# Patient Record
Sex: FEMALE | Race: OTHER | Employment: PART TIME | URBAN - METROPOLITAN AREA
[De-identification: names, ages, dates, MRNs, and addresses within clinical notes are randomized per-mention and may not be internally consistent; named-entity substitution may affect disease eponyms.]

---

## 2017-07-05 ENCOUNTER — APPOINTMENT (OUTPATIENT)
Dept: ULTRASOUND IMAGING | Age: 27
End: 2017-07-05
Attending: PHYSICIAN ASSISTANT
Payer: MEDICAID

## 2017-07-05 ENCOUNTER — HOSPITAL ENCOUNTER (EMERGENCY)
Age: 27
Discharge: HOME | End: 2017-07-05
Attending: EMERGENCY MEDICINE | Admitting: EMERGENCY MEDICINE
Payer: MEDICAID

## 2017-07-05 VITALS
DIASTOLIC BLOOD PRESSURE: 78 MMHG | RESPIRATION RATE: 18 BRPM | SYSTOLIC BLOOD PRESSURE: 118 MMHG | OXYGEN SATURATION: 100 % | BODY MASS INDEX: 26.55 KG/M2 | TEMPERATURE: 98.9 F | WEIGHT: 165.2 LBS | HEART RATE: 84 BPM | HEIGHT: 66 IN

## 2017-07-05 DIAGNOSIS — O20.0 MISCARRIAGE, THREATENED, EARLY PREGNANCY: Primary | ICD-10-CM

## 2017-07-05 LAB
ABO + RH BLD: NORMAL
ALBUMIN SERPL BCP-MCNC: 3.8 G/DL (ref 3.5–5)
ALBUMIN/GLOB SERPL: 1 {RATIO} (ref 1.1–2.2)
ALP SERPL-CCNC: 46 U/L (ref 45–117)
ALT SERPL-CCNC: 17 U/L (ref 12–78)
ANION GAP BLD CALC-SCNC: 7 MMOL/L (ref 5–15)
APPEARANCE UR: CLEAR
AST SERPL W P-5'-P-CCNC: 9 U/L (ref 15–37)
BACTERIA URNS QL MICRO: NEGATIVE /HPF
BASOPHILS # BLD AUTO: 0 K/UL (ref 0–0.1)
BASOPHILS # BLD: 0 % (ref 0–1)
BILIRUB SERPL-MCNC: 0.4 MG/DL (ref 0.2–1)
BILIRUB UR QL: NEGATIVE
BLOOD BANK CMNT PATIENT-IMP: NORMAL
BUN SERPL-MCNC: 11 MG/DL (ref 6–20)
BUN/CREAT SERPL: 12 (ref 12–20)
CALCIUM SERPL-MCNC: 8.6 MG/DL (ref 8.5–10.1)
CHLORIDE SERPL-SCNC: 103 MMOL/L (ref 97–108)
CO2 SERPL-SCNC: 26 MMOL/L (ref 21–32)
COLOR UR: NORMAL
CREAT SERPL-MCNC: 0.9 MG/DL (ref 0.55–1.02)
EOSINOPHIL # BLD: 0.1 K/UL (ref 0–0.4)
EOSINOPHIL NFR BLD: 3 % (ref 0–7)
EPITH CASTS URNS QL MICRO: NORMAL /LPF
ERYTHROCYTE [DISTWIDTH] IN BLOOD BY AUTOMATED COUNT: 13.2 % (ref 11.5–14.5)
GLOBULIN SER CALC-MCNC: 3.9 G/DL (ref 2–4)
GLUCOSE SERPL-MCNC: 94 MG/DL (ref 65–100)
GLUCOSE UR STRIP.AUTO-MCNC: NEGATIVE MG/DL
HCG SERPL-ACNC: ABNORMAL MIU/ML (ref 0–6)
HCT VFR BLD AUTO: 36.8 % (ref 35–47)
HGB BLD-MCNC: 12.5 G/DL (ref 11.5–16)
HGB UR QL STRIP: NEGATIVE
HYALINE CASTS URNS QL MICRO: NORMAL /LPF (ref 0–5)
KETONES UR QL STRIP.AUTO: NEGATIVE MG/DL
LEUKOCYTE ESTERASE UR QL STRIP.AUTO: NEGATIVE
LYMPHOCYTES # BLD AUTO: 39 % (ref 12–49)
LYMPHOCYTES # BLD: 1.8 K/UL (ref 0.8–3.5)
MCH RBC QN AUTO: 28.5 PG (ref 26–34)
MCHC RBC AUTO-ENTMCNC: 34 G/DL (ref 30–36.5)
MCV RBC AUTO: 83.8 FL (ref 80–99)
MONOCYTES # BLD: 0.4 K/UL (ref 0–1)
MONOCYTES NFR BLD AUTO: 10 % (ref 5–13)
NEUTS SEG # BLD: 2.1 K/UL (ref 1.8–8)
NEUTS SEG NFR BLD AUTO: 48 % (ref 32–75)
NITRITE UR QL STRIP.AUTO: NEGATIVE
PH UR STRIP: 7.5 [PH] (ref 5–8)
PLATELET # BLD AUTO: 228 K/UL (ref 150–400)
POTASSIUM SERPL-SCNC: 3.9 MMOL/L (ref 3.5–5.1)
PROT SERPL-MCNC: 7.7 G/DL (ref 6.4–8.2)
PROT UR STRIP-MCNC: NEGATIVE MG/DL
RBC # BLD AUTO: 4.39 M/UL (ref 3.8–5.2)
RBC #/AREA URNS HPF: NORMAL /HPF (ref 0–5)
SODIUM SERPL-SCNC: 136 MMOL/L (ref 136–145)
SP GR UR REFRACTOMETRY: 1.02 (ref 1–1.03)
UA: UC IF INDICATED,UAUC: NORMAL
UROBILINOGEN UR QL STRIP.AUTO: 1 EU/DL (ref 0.2–1)
WBC # BLD AUTO: 4.5 K/UL (ref 3.6–11)
WBC URNS QL MICRO: NORMAL /HPF (ref 0–4)

## 2017-07-05 PROCEDURE — 81001 URINALYSIS AUTO W/SCOPE: CPT | Performed by: PHYSICIAN ASSISTANT

## 2017-07-05 PROCEDURE — 74011250636 HC RX REV CODE- 250/636: Performed by: PHYSICIAN ASSISTANT

## 2017-07-05 PROCEDURE — 96360 HYDRATION IV INFUSION INIT: CPT

## 2017-07-05 PROCEDURE — 86900 BLOOD TYPING SEROLOGIC ABO: CPT | Performed by: PHYSICIAN ASSISTANT

## 2017-07-05 PROCEDURE — 85025 COMPLETE CBC W/AUTO DIFF WBC: CPT | Performed by: PHYSICIAN ASSISTANT

## 2017-07-05 PROCEDURE — 84702 CHORIONIC GONADOTROPIN TEST: CPT | Performed by: PHYSICIAN ASSISTANT

## 2017-07-05 PROCEDURE — 80053 COMPREHEN METABOLIC PANEL: CPT | Performed by: PHYSICIAN ASSISTANT

## 2017-07-05 PROCEDURE — 99283 EMERGENCY DEPT VISIT LOW MDM: CPT

## 2017-07-05 PROCEDURE — 36415 COLL VENOUS BLD VENIPUNCTURE: CPT | Performed by: PHYSICIAN ASSISTANT

## 2017-07-05 PROCEDURE — 76801 OB US < 14 WKS SINGLE FETUS: CPT

## 2017-07-05 PROCEDURE — 76817 TRANSVAGINAL US OBSTETRIC: CPT

## 2017-07-05 RX ADMIN — SODIUM CHLORIDE 1000 ML: 900 INJECTION, SOLUTION INTRAVENOUS at 21:08

## 2017-07-06 NOTE — DISCHARGE INSTRUCTIONS
Threatened Miscarriage: Care Instructions  Your Care Instructions    Some women have light spotting or bleeding during the first 12 weeks of pregnancy. In some cases this is normal. Light spotting or bleeding can also be a sign of a possible loss of the pregnancy. This is called a threatened miscarriage. At this point, the doctor may not be able to tell if your vaginal bleeding is normal or is a sign of a miscarriage. In early pregnancy, things such as stress, exercise, and sex do not cause miscarriage. You may be worried or upset about the possibility of losing your pregnancy. But do not blame yourself. There is no treatment to stop a threatened miscarriage. If you do have a miscarriage, there was nothing you could have done to prevent it. A miscarriage usually means that the pregnancy is not developing normally. The doctor has checked you carefully, but problems can develop later. If you notice any problems or new symptoms, get medical treatment right away. Follow-up care is a key part of your treatment and safety. Be sure to make and go to all appointments, and call your doctor if you are having problems. It's also a good idea to know your test results and keep a list of the medicines you take. How can you care for yourself at home? · If you do have a miscarriage, you will probably have some vaginal bleeding for 1 to 2 weeks. Use pads instead of tampons. · Take acetaminophen (Tylenol) for cramps. Read and follow all instructions on the label. · Do not take two or more pain medicines at the same time unless the doctor told you to. Many pain medicines have acetaminophen, which is Tylenol. Too much acetaminophen (Tylenol) can be harmful. · Do not have sex until your doctor says it is okay. · Get lots of rest over the next several days. · You may do your normal activities if you feel well enough to do them. But do not do any heavy exercise until your doctor says it is okay.   · Eat a balanced diet that is high in iron and vitamin C. Foods rich in iron include red meat, shellfish, eggs, beans, and leafy green vegetables. Foods high in vitamin C include citrus fruits, tomatoes, and broccoli. Talk to your doctor about whether you need to take iron pills or a multivitamin. · Do not drink alcohol or use tobacco or illegal drugs. · Do not smoke. If you need help quitting, talk to your doctor about stop-smoking programs and medicines. These can increase your chances of quitting for good. When should you call for help? Call 911 anytime you think you may need emergency care. For example, call if:  · You have sudden, severe pain in your belly or pelvis. · You passed out (lost consciousness). · You have severe vaginal bleeding. Call your doctor now or seek immediate medical care if:  · You are dizzy or lightheaded, or you feel like you may faint. · You have new or increased pain in your belly or pelvis. · Your vaginal bleeding is getting worse. · You have increased pain in the vaginal area. · You have a fever. · You think you may have passed tissue. Save any tissue that you pass. Take it to your doctor's office as soon as you can. Watch closely for changes in your health, and be sure to contact your doctor if:  · You have new or worse vaginal discharge. · You do not get better as expected. Where can you learn more? Go to http://errol-kamla.info/. Enter Y217 in the search box to learn more about \"Threatened Miscarriage: Care Instructions. \"  Current as of: March 16, 2017  Content Version: 11.3  © 7454-5811 [employer], [employer]. Care instructions adapted under license by [employer] (which disclaims liability or warranty for this information). If you have questions about a medical condition or this instruction, always ask your healthcare professional. [address] any warranty or liability for your use of this information.          We hope that we have addressed all of your medical concerns. The examination and treatment you received in the Emergency Department were for an emergent problem and were not intended as complete care. It is important that you follow up with your healthcare provider(s) for ongoing care. If your symptoms worsen or do not improve as expected, and you are unable to reach your usual health care provider(s), you should return to the Emergency Department. Today's healthcare is undergoing tremendous change, and patient satisfaction surveys are one of the many tools to assess the quality of medical care. You may receive a survey from the [employer] organization regarding your experience in the Emergency Department. I hope that your experience has been completely positive, particularly the medical care that I provided. As such, please participate in the survey; anything less than excellent does not meet my expectations or intentions. [facility] and [address] [place] [address] participate in nationally recognized quality of care measures. If your blood pressure is greater than 120/80, as reported below, we urge that you seek medical care to address the potential of high blood pressure, commonly known as hypertension. Hypertension can be hereditary or can be caused by certain medical conditions, pain, stress, or \"white coat syndrome. \"       Please make an appointment with your health care provider(s) for follow up of your Emergency Department visit. VITALS:   Patient Vitals for the past 8 hrs:   Temp Pulse Resp BP SpO2   07/05/17 2054 99.5 °F (37.5 °C) 86 18 115/75 100 %          Thank you for allowing us to provide you with medical care today. We realize that you have many choices for your emergency care needs. Please choose us in the future for any continued health care needs. CHRISTOPH Barreto    [facility].   Office: [phone]            Recent Results (from the past 24 hour(s))   CBC WITH AUTOMATED DIFF    Collection Time: 07/05/17  9:05 PM   Result Value Ref Range    WBC 4.5 3.6 - 11.0 K/uL    RBC 4.39 3.80 - 5.20 M/uL    HGB 12.5 11.5 - 16.0 g/dL    HCT 36.8 35.0 - 47.0 %    MCV 83.8 80.0 - 99.0 FL    MCH 28.5 26.0 - 34.0 PG    MCHC 34.0 30.0 - 36.5 g/dL    RDW 13.2 11.5 - 14.5 %    PLATELET 671 133 - 792 K/uL    NEUTROPHILS 48 32 - 75 %    LYMPHOCYTES 39 12 - 49 %    MONOCYTES 10 5 - 13 %    EOSINOPHILS 3 0 - 7 %    BASOPHILS 0 0 - 1 %    ABS. NEUTROPHILS 2.1 1.8 - 8.0 K/UL    ABS. LYMPHOCYTES 1.8 0.8 - 3.5 K/UL    ABS. MONOCYTES 0.4 0.0 - 1.0 K/UL    ABS. EOSINOPHILS 0.1 0.0 - 0.4 K/UL    ABS. BASOPHILS 0.0 0.0 - 0.1 K/UL   METABOLIC PANEL, COMPREHENSIVE    Collection Time: 07/05/17  9:05 PM   Result Value Ref Range    Sodium 136 136 - 145 mmol/L    Potassium 3.9 3.5 - 5.1 mmol/L    Chloride 103 97 - 108 mmol/L    CO2 26 21 - 32 mmol/L    Anion gap 7 5 - 15 mmol/L    Glucose 94 65 - 100 mg/dL    BUN 11 6 - 20 MG/DL    Creatinine 0.90 0.55 - 1.02 MG/DL    BUN/Creatinine ratio 12 12 - 20      GFR est AA >60 >60 ml/min/1.73m2    GFR est non-AA >60 >60 ml/min/1.73m2    Calcium 8.6 8.5 - 10.1 MG/DL    Bilirubin, total 0.4 0.2 - 1.0 MG/DL    ALT (SGPT) 17 12 - 78 U/L    AST (SGOT) 9 (L) 15 - 37 U/L    Alk. phosphatase 46 45 - 117 U/L    Protein, total 7.7 6.4 - 8.2 g/dL    Albumin 3.8 3.5 - 5.0 g/dL    Globulin 3.9 2.0 - 4.0 g/dL    A-G Ratio 1.0 (L) 1.1 - 2.2     TOTAL HCG, QT.     Collection Time: 07/05/17  9:05 PM   Result Value Ref Range    Beta HCG, QT 760098 (H) 0 - 6 MIU/ML   TYPE, ABO & RH    Collection Time: 07/05/17  9:05 PM   Result Value Ref Range    ABO/Rh(D) B POSITIVE     Comment SAMPLE NOT USABLE FOR CROSSMATCH    URINALYSIS W/ REFLEX CULTURE    Collection Time: 07/05/17  9:05 PM   Result Value Ref Range    Color YELLOW/STRAW      Appearance CLEAR CLEAR      Specific gravity 1.020 1.003 - 1.030      pH (UA) 7.5 5.0 - 8.0      Protein NEGATIVE NEG mg/dL    Glucose NEGATIVE  NEG mg/dL    Ketone NEGATIVE  NEG mg/dL    Bilirubin NEGATIVE  NEG      Blood NEGATIVE  NEG      Urobilinogen 1.0 0.2 - 1.0 EU/dL    Nitrites NEGATIVE  NEG      Leukocyte Esterase NEGATIVE  NEG      WBC 0-4 0 - 4 /hpf    RBC 0-5 0 - 5 /hpf    Epithelial cells FEW FEW /lpf    Bacteria NEGATIVE  NEG /hpf    UA:UC IF INDICATED CULTURE NOT INDICATED BY UA RESULT CNI      Hyaline cast 0-2 0 - 5 /lpf       Us Uts Transvaginal Ob    Result Date: 7/5/2017  EXAM:  US PREG UTS < 14 WKS SNGL, US UTS TRANSVAGINAL OB INDICATION: Pelvic cramping and bleeding for one day. LMP  5/7/2017. COMPARISON: None. TECHNIQUE: Transabdominal and transvaginal sonography of the pelvis was performed. Real-time sonography of the pelvis was performed transabdominally with multiple static images obtained. Transvaginal sonography was performed with multiple static images of the uterus and ovaries obtained. TRANSABDOMINAL FINDINGS: UTERUS: The uterus measures 8.9 x 6.8 x 5.5 cm. The examination demonstrates a single intrauterine pregnancy with a crown-rump length of 1.2 cm and gestational sac measurement of 2.5 cm estimated gestational age of 7 weeks 2 days. Fetal cardiac activity is identified with a fetal heart rate of 160 beats per minute. The gestation is too early to permit evaluation of the placental anatomic structure and assessment of qualitative amniotic fluid volume. OVARIES: The ovaries are normal in appearance. The right ovary measures 3.6 x 3.5 x 2.6 cm and the left ovary measures 2.2 x 2.2 x 1.4 cm. There is a right ovarian cyst. There is normal color flow to the ovaries. Leann Christopher FINDINGS: UTERUS: The examination demonstrates a single intrauterine pregnancy with a crown-rump length of 1.2 cm and gestational sac measurement of 3 cm estimated gestational age of 9 weeks 4 days +/- 4 days. Fetal cardiac activity is identified with a fetal heart rate of 158 beats per minute.  The gestation is too early to permit evaluation of the placental anatomic structure and assessment of qualitative amniotic fluid volume. OVARIES: The right ovary measures 4.3 x 3.7 x 3.2 cm with a 3.5 x 3.4 x 3 cm cyst and the left ovary not visualized due to bowel gas. There is normal color flow to the right ovary. .     IMPRESSION: Single live 7 week 4 day +/- 4 day intrauterine pregnancy. Us Preg Uts < 14 Wks Sngl    Result Date: 7/5/2017  EXAM:  US PREG UTS < 14 WKS SNGL, US UTS TRANSVAGINAL OB INDICATION: Pelvic cramping and bleeding for one day. LMP  5/7/2017. COMPARISON: None. TECHNIQUE: Transabdominal and transvaginal sonography of the pelvis was performed. Real-time sonography of the pelvis was performed transabdominally with multiple static images obtained. Transvaginal sonography was performed with multiple static images of the uterus and ovaries obtained. TRANSABDOMINAL FINDINGS: UTERUS: The uterus measures 8.9 x 6.8 x 5.5 cm. The examination demonstrates a single intrauterine pregnancy with a crown-rump length of 1.2 cm and gestational sac measurement of 2.5 cm estimated gestational age of 7 weeks 2 days. Fetal cardiac activity is identified with a fetal heart rate of 160 beats per minute. The gestation is too early to permit evaluation of the placental anatomic structure and assessment of qualitative amniotic fluid volume. OVARIES: The ovaries are normal in appearance. The right ovary measures 3.6 x 3.5 x 2.6 cm and the left ovary measures 2.2 x 2.2 x 1.4 cm. There is a right ovarian cyst. There is normal color flow to the ovaries. [place] [employer] FINDINGS: UTERUS: The examination demonstrates a single intrauterine pregnancy with a crown-rump length of 1.2 cm and gestational sac measurement of 3 cm estimated gestational age of 9 weeks 4 days +/- 4 days. Fetal cardiac activity is identified with a fetal heart rate of 158 beats per minute.  The gestation is too early to permit evaluation of the placental anatomic structure and assessment of qualitative amniotic fluid volume. OVARIES: The right ovary measures 4.3 x 3.7 x 3.2 cm with a 3.5 x 3.4 x 3 cm cyst and the left ovary not visualized due to bowel gas. There is normal color flow to the right ovary. .     IMPRESSION: Single live 7 week 4 day +/- 4 day intrauterine pregnancy.

## 2017-07-06 NOTE — ED PROVIDER NOTES
HPI Comments: 22 yo female with no significant PMH here for evaluation of vaginal bleeding. States she took a home pregnancy test that was positive. Tonight with lower abd cramping and spotting. Denies fever, chills, CP, SOB, flank pain, urinary symptoms. LMP May 7th; no current GYN. Non smoker. Patient is a 23 y.o. female presenting with pregnancy problem. The history is provided by the patient. The history is limited by a language barrier. A  was used. Pregnancy Problem    This is a new problem. The current episode started 3 to 5 hours ago. The problem occurs constantly. The pain is located in the suprapubic region. The quality of the pain is cramping. The pain is at a severity of 4/10. The pain is mild. Pertinent negatives include no dysuria, no frequency, no headaches, no myalgias, no chest pain and no back pain. Nothing worsens the pain. The pain is relieved by nothing. History reviewed. No pertinent past medical history. Past Surgical History:   Procedure Laterality Date    HX  SECTION           History reviewed. No pertinent family history. Social History     Social History    Marital status:      Spouse name: N/A    Number of children: N/A    Years of education: N/A     Occupational History    Not on file. Social History Main Topics    Smoking status: Never Smoker    Smokeless tobacco: Never Used    Alcohol use No    Drug use: No    Sexual activity: Yes     Partners: Male     Other Topics Concern    Not on file     Social History Narrative         ALLERGIES: Review of patient's allergies indicates no known allergies. Review of Systems   Constitutional: Negative. HENT: Negative for ear discharge. Eyes: Negative for photophobia, pain, discharge and visual disturbance. Respiratory: Negative for apnea, cough, chest tightness and shortness of breath. Cardiovascular: Negative for chest pain, palpitations and leg swelling. Gastrointestinal: Positive for abdominal pain. Negative for abdominal distention and blood in stool. Genitourinary: Positive for vaginal bleeding. Negative for difficulty urinating, dysuria, flank pain and frequency. Musculoskeletal: Negative for back pain, gait problem, joint swelling, myalgias and neck pain. Skin: Negative for color change and pallor. Neurological: Negative for dizziness, syncope, weakness, numbness and headaches. Psychiatric/Behavioral: Negative for behavioral problems and confusion. The patient is not nervous/anxious. Vitals:    07/05/17 2054   BP: 115/75   Pulse: 86   Resp: 18   Temp: 99.5 °F (37.5 °C)   SpO2: 100%   Weight: 74.9 kg (165 lb 3.2 oz)   Height: 5' 6\" (1.676 m)            Physical Exam   Constitutional: She is oriented to person, place, and time. She appears well-developed and well-nourished. HENT:   Head: Normocephalic and atraumatic. Right Ear: External ear normal.   Left Ear: External ear normal.   Nose: Nose normal.   Mouth/Throat: Oropharynx is clear and moist.   Eyes: Conjunctivae and EOM are normal. Pupils are equal, round, and reactive to light. Right eye exhibits no discharge. Left eye exhibits no discharge. Neck: Normal range of motion. Neck supple. Cardiovascular: Normal rate, regular rhythm, normal heart sounds and intact distal pulses. Pulmonary/Chest: Effort normal and breath sounds normal.   Abdominal: Soft. Bowel sounds are normal. She exhibits no distension. There is tenderness (Mild suprapubic). There is no rebound and no guarding. Musculoskeletal: Normal range of motion. She exhibits no edema or tenderness. Neurological: She is alert and oriented to person, place, and time. No cranial nerve deficit. Coordination normal.   Skin: Skin is warm and dry. No rash noted. Psychiatric: She has a normal mood and affect. Her behavior is normal. Judgment and thought content normal.   Nursing note and vitals reviewed.        MDM  Number of Diagnoses or Management Options  Miscarriage, threatened, early pregnancy:   Diagnosis management comments: 24 yo pregnant female with vaginal bleeding; labs and US done; pt aware of threatened miscarriage and early pregnancy; will have GYN followup. Given clinic and GYN information. CHRISTOPH Busby         Amount and/or Complexity of Data Reviewed  Clinical lab tests: ordered and reviewed  Tests in the radiology section of CPT®: ordered and reviewed  Discuss the patient with other providers: yes  Independent visualization of images, tracings, or specimens: yes      ED Course       Procedures    Patient has been reassessed. Feeling Better. Reviewed labs, medications and radiographics with patient using  phone. Ready to discharge home. Will follow up with GYN. Discussed case with attending Physician. Agrees with care and will D/C with follow up. Patient's results have been reviewed with them. Patient and/or family have verbally conveyed their understanding and agreement of the patient's signs, symptoms, diagnosis, treatment and prognosis and additionally agree to follow up as recommended or return to the Emergency Room should their condition change prior to follow-up. Discharge instructions have also been provided to the patient with some educational information regarding their diagnosis as well a list of reasons why they would want to return to the ER prior to their follow-up appointment should their condition change.   CHRISTOPH Busby

## 2017-07-06 NOTE — ED NOTES
Pt given discharge instructions via telephone . All questions answered. Pt ambulatory with no complications at the time of discharge with  providing transportation.

## 2017-07-06 NOTE — ED TRIAGE NOTES
Triage: Pt to the ED for a \"baby test\". Both Pt and spouse speak little English and difficult to determine exact reason for coming to the ED. Pt speaks Hebrew, translation services utilized to aid with triage. Pt states she took a pregnancy test recently which yielded positive results. Today, she experienced vaginal bleeding and abd pain/cramping and she is concerned about the pregnancy. Pt states she has had to change her pad twice since onset at 1800 hours.

## 2017-08-23 LAB
ANTIBODY SCREEN, EXTERNAL: NEGATIVE
HBSAG, EXTERNAL: NON REACTIVE
HIV, EXTERNAL: NON REACTIVE
RPR, EXTERNAL: NON REACTIVE
RUBELLA, EXTERNAL: NORMAL
TYPE, ABO & RH, EXTERNAL: NORMAL

## 2018-01-18 LAB — GRBS, EXTERNAL: NEGATIVE

## 2018-02-02 ENCOUNTER — HOSPITAL ENCOUNTER (OUTPATIENT)
Dept: OTHER | Age: 28
Discharge: HOME | End: 2018-02-02
Payer: COMMERCIAL

## 2018-02-02 VITALS — HEIGHT: 61 IN | WEIGHT: 160.94 LBS | BODY MASS INDEX: 30.39 KG/M2

## 2018-02-02 LAB
ABO + RH BLD: NORMAL
BLOOD GROUP ANTIBODIES SERPL: NORMAL
ERYTHROCYTE [DISTWIDTH] IN BLOOD BY AUTOMATED COUNT: 13.7 % (ref 11.5–14.5)
HCT VFR BLD AUTO: 35.2 % (ref 35–47)
HGB BLD-MCNC: 12.3 G/DL (ref 11.5–16)
MCH RBC QN AUTO: 30.4 PG (ref 26–34)
MCHC RBC AUTO-ENTMCNC: 34.9 G/DL (ref 30–36.5)
MCV RBC AUTO: 87.1 FL (ref 80–99)
NRBC # BLD: 0 K/UL (ref 0–0.01)
NRBC BLD-RTO: 0 PER 100 WBC
PLATELET # BLD AUTO: 150 K/UL (ref 150–400)
PMV BLD AUTO: 10.2 FL (ref 8.9–12.9)
RBC # BLD AUTO: 4.04 M/UL (ref 3.8–5.2)
SPECIMEN EXP DATE BLD: NORMAL
WBC # BLD AUTO: 3.8 K/UL (ref 3.6–11)

## 2018-02-02 PROCEDURE — 36415 COLL VENOUS BLD VENIPUNCTURE: CPT | Performed by: OBSTETRICS & GYNECOLOGY

## 2018-02-02 PROCEDURE — 85027 COMPLETE CBC AUTOMATED: CPT | Performed by: OBSTETRICS & GYNECOLOGY

## 2018-02-02 PROCEDURE — 86900 BLOOD TYPING SEROLOGIC ABO: CPT | Performed by: OBSTETRICS & GYNECOLOGY

## 2018-02-02 RX ORDER — RANITIDINE HCL 75 MG
75 TABLET ORAL DAILY
COMMUNITY

## 2018-02-02 NOTE — PROGRESS NOTES
1100 Patient here for Pre-Admission Testing (PAT) for scheduled  section. Language line assisted with information. Pt speaks [employer]. Her language is Chinese.  # [id number] and [id number]. Had to call back on second phone because of poor sound quality on the first phone. PAT packet reviewed with the patient. Labs drawn and sent. BAILEY wipes and preventing surgical site infection education given to the patient. Education also provided to be NPO after Midnight and to arrive for scheduled procedure at 0600 on 18. Patient verbalizes understanding and sent home with PAT packet for further review. Patient instructed to take no medication(s) on the morning of her scheduled procedure.

## 2018-02-05 ENCOUNTER — HOSPITAL ENCOUNTER (INPATIENT)
Age: 28
LOS: 2 days | Discharge: HOME | DRG: 540 | End: 2018-02-07
Attending: OBSTETRICS & GYNECOLOGY | Admitting: OBSTETRICS & GYNECOLOGY
Payer: COMMERCIAL

## 2018-02-05 ENCOUNTER — ANESTHESIA EVENT (OUTPATIENT)
Dept: LABOR AND DELIVERY | Age: 28
DRG: 540 | End: 2018-02-05
Payer: COMMERCIAL

## 2018-02-05 ENCOUNTER — ANESTHESIA (OUTPATIENT)
Dept: LABOR AND DELIVERY | Age: 28
DRG: 540 | End: 2018-02-05
Payer: COMMERCIAL

## 2018-02-05 PROBLEM — Z3A.39 39 WEEKS GESTATION OF PREGNANCY: Status: ACTIVE | Noted: 2018-02-05

## 2018-02-05 PROCEDURE — 76010000391 HC C SECN FIRST 1 HR: Performed by: OBSTETRICS & GYNECOLOGY

## 2018-02-05 PROCEDURE — 65410000002 HC RM PRIVATE OB

## 2018-02-05 PROCEDURE — 77030007866 HC KT SPN ANES BBMI -B: Performed by: ANESTHESIOLOGY

## 2018-02-05 PROCEDURE — 74011250636 HC RX REV CODE- 250/636

## 2018-02-05 PROCEDURE — 76010000392 HC C SECN EA ADDL 0.5 HR: Performed by: OBSTETRICS & GYNECOLOGY

## 2018-02-05 PROCEDURE — 76060000078 HC EPIDURAL ANESTHESIA: Performed by: OBSTETRICS & GYNECOLOGY

## 2018-02-05 PROCEDURE — 74011250636 HC RX REV CODE- 250/636: Performed by: OBSTETRICS & GYNECOLOGY

## 2018-02-05 PROCEDURE — 75410000003 HC RECOV DEL/VAG/CSECN EA 0.5 HR: Performed by: OBSTETRICS & GYNECOLOGY

## 2018-02-05 PROCEDURE — 77030012890

## 2018-02-05 PROCEDURE — 77030034849

## 2018-02-05 PROCEDURE — 74011000250 HC RX REV CODE- 250

## 2018-02-05 PROCEDURE — 10907ZC DRAINAGE OF AMNIOTIC FLUID, THERAPEUTIC FROM PRODUCTS OF CONCEPTION, VIA NATURAL OR ARTIFICIAL OPENING: ICD-10-PCS | Performed by: OBSTETRICS & GYNECOLOGY

## 2018-02-05 PROCEDURE — 74011250636 HC RX REV CODE- 250/636: Performed by: ANESTHESIOLOGY

## 2018-02-05 RX ORDER — MORPHINE SULFATE 0.5 MG/ML
INJECTION, SOLUTION EPIDURAL; INTRATHECAL; INTRAVENOUS AS NEEDED
Status: DISCONTINUED | OUTPATIENT
Start: 2018-02-05 | End: 2018-02-05 | Stop reason: HOSPADM

## 2018-02-05 RX ORDER — BUPIVACAINE HYDROCHLORIDE 7.5 MG/ML
INJECTION, SOLUTION EPIDURAL; RETROBULBAR AS NEEDED
Status: DISCONTINUED | OUTPATIENT
Start: 2018-02-05 | End: 2018-02-05 | Stop reason: HOSPADM

## 2018-02-05 RX ORDER — NALBUPHINE HYDROCHLORIDE 10 MG/ML
2.5 INJECTION, SOLUTION INTRAMUSCULAR; INTRAVENOUS; SUBCUTANEOUS
Status: ACTIVE | OUTPATIENT
Start: 2018-02-05 | End: 2018-02-06

## 2018-02-05 RX ORDER — ACETAMINOPHEN 325 MG/1
650 TABLET ORAL
Status: DISCONTINUED | OUTPATIENT
Start: 2018-02-05 | End: 2018-02-07 | Stop reason: HOSPADM

## 2018-02-05 RX ORDER — KETOROLAC TROMETHAMINE 30 MG/ML
30 INJECTION, SOLUTION INTRAMUSCULAR; INTRAVENOUS
Status: DISPENSED | OUTPATIENT
Start: 2018-02-05 | End: 2018-02-06

## 2018-02-05 RX ORDER — MORPHINE SULFATE 2 MG/ML
2 INJECTION, SOLUTION INTRAMUSCULAR; INTRAVENOUS
Status: ACTIVE | OUTPATIENT
Start: 2018-02-05 | End: 2018-02-06

## 2018-02-05 RX ORDER — DIPHENHYDRAMINE HYDROCHLORIDE 50 MG/ML
25 INJECTION, SOLUTION INTRAMUSCULAR; INTRAVENOUS
Status: DISCONTINUED | OUTPATIENT
Start: 2018-02-05 | End: 2018-02-07 | Stop reason: HOSPADM

## 2018-02-05 RX ORDER — SODIUM CHLORIDE 0.9 % (FLUSH) 0.9 %
5-10 SYRINGE (ML) INJECTION AS NEEDED
Status: DISCONTINUED | OUTPATIENT
Start: 2018-02-05 | End: 2018-02-05 | Stop reason: HOSPADM

## 2018-02-05 RX ORDER — HYDROCORTISONE 1 %
CREAM (GRAM) TOPICAL AS NEEDED
Status: DISCONTINUED | OUTPATIENT
Start: 2018-02-05 | End: 2018-02-07 | Stop reason: HOSPADM

## 2018-02-05 RX ORDER — PHENYLEPHRINE 10 MG/250 ML(40 MCG/ML)IN 0.9 % SOD.CHLORIDE INTRAVENOUS
Status: DISPENSED
Start: 2018-02-05 | End: 2018-02-05

## 2018-02-05 RX ORDER — OXYCODONE AND ACETAMINOPHEN 5; 325 MG/1; MG/1
2 TABLET ORAL
Status: DISCONTINUED | OUTPATIENT
Start: 2018-02-05 | End: 2018-02-07 | Stop reason: HOSPADM

## 2018-02-05 RX ORDER — OXYTOCIN/RINGER'S LACTATE 20/1000 ML
125-1000 PLASTIC BAG, INJECTION (ML) INTRAVENOUS AS NEEDED
Status: DISCONTINUED | OUTPATIENT
Start: 2018-02-05 | End: 2018-02-07 | Stop reason: HOSPADM

## 2018-02-05 RX ORDER — ACETAMINOPHEN 10 MG/ML
INJECTION, SOLUTION INTRAVENOUS AS NEEDED
Status: DISCONTINUED | OUTPATIENT
Start: 2018-02-05 | End: 2018-02-05 | Stop reason: HOSPADM

## 2018-02-05 RX ORDER — AMMONIA 15 % (W/V)
1 AMPUL (EA) INHALATION AS NEEDED
Status: DISCONTINUED | OUTPATIENT
Start: 2018-02-05 | End: 2018-02-07 | Stop reason: HOSPADM

## 2018-02-05 RX ORDER — CEFAZOLIN SODIUM IN 0.9 % NACL 2 G/100 ML
PLASTIC BAG, INJECTION (ML) INTRAVENOUS AS NEEDED
Status: DISCONTINUED | OUTPATIENT
Start: 2018-02-05 | End: 2018-02-05 | Stop reason: HOSPADM

## 2018-02-05 RX ORDER — ONDANSETRON 2 MG/ML
4 INJECTION INTRAMUSCULAR; INTRAVENOUS
Status: DISCONTINUED | OUTPATIENT
Start: 2018-02-05 | End: 2018-02-07 | Stop reason: HOSPADM

## 2018-02-05 RX ORDER — SIMETHICONE 80 MG
80 TABLET,CHEWABLE ORAL
Status: DISCONTINUED | OUTPATIENT
Start: 2018-02-05 | End: 2018-02-07 | Stop reason: HOSPADM

## 2018-02-05 RX ORDER — NALOXONE HYDROCHLORIDE 0.4 MG/ML
0.2 INJECTION, SOLUTION INTRAMUSCULAR; INTRAVENOUS; SUBCUTANEOUS
Status: ACTIVE | OUTPATIENT
Start: 2018-02-05 | End: 2018-02-06

## 2018-02-05 RX ORDER — SODIUM CHLORIDE, SODIUM LACTATE, POTASSIUM CHLORIDE, CALCIUM CHLORIDE 600; 310; 30; 20 MG/100ML; MG/100ML; MG/100ML; MG/100ML
125 INJECTION, SOLUTION INTRAVENOUS CONTINUOUS
Status: DISCONTINUED | OUTPATIENT
Start: 2018-02-05 | End: 2018-02-07 | Stop reason: HOSPADM

## 2018-02-05 RX ORDER — EPHEDRINE SULFATE 50 MG/ML
INJECTION, SOLUTION INTRAVENOUS AS NEEDED
Status: DISCONTINUED | OUTPATIENT
Start: 2018-02-05 | End: 2018-02-05 | Stop reason: HOSPADM

## 2018-02-05 RX ORDER — NALBUPHINE HYDROCHLORIDE 10 MG/ML
2.5 INJECTION, SOLUTION INTRAMUSCULAR; INTRAVENOUS; SUBCUTANEOUS
Status: DISCONTINUED | OUTPATIENT
Start: 2018-02-05 | End: 2018-02-07 | Stop reason: HOSPADM

## 2018-02-05 RX ORDER — ACETAMINOPHEN 10 MG/ML
1000 INJECTION, SOLUTION INTRAVENOUS
Status: ACTIVE | OUTPATIENT
Start: 2018-02-05 | End: 2018-02-06

## 2018-02-05 RX ORDER — SODIUM CHLORIDE 0.9 % (FLUSH) 0.9 %
5-10 SYRINGE (ML) INJECTION AS NEEDED
Status: DISCONTINUED | OUTPATIENT
Start: 2018-02-05 | End: 2018-02-07 | Stop reason: HOSPADM

## 2018-02-05 RX ORDER — SODIUM CHLORIDE, SODIUM LACTATE, POTASSIUM CHLORIDE, CALCIUM CHLORIDE 600; 310; 30; 20 MG/100ML; MG/100ML; MG/100ML; MG/100ML
1000 INJECTION, SOLUTION INTRAVENOUS CONTINUOUS
Status: DISCONTINUED | OUTPATIENT
Start: 2018-02-05 | End: 2018-02-05 | Stop reason: HOSPADM

## 2018-02-05 RX ORDER — MORPHINE SULFATE 10 MG/ML
5 INJECTION, SOLUTION INTRAMUSCULAR; INTRAVENOUS
Status: ACTIVE | OUTPATIENT
Start: 2018-02-05 | End: 2018-02-06

## 2018-02-05 RX ORDER — DOCUSATE SODIUM 100 MG/1
100 CAPSULE, LIQUID FILLED ORAL
Status: DISCONTINUED | OUTPATIENT
Start: 2018-02-05 | End: 2018-02-07 | Stop reason: HOSPADM

## 2018-02-05 RX ORDER — SODIUM CHLORIDE 0.9 % (FLUSH) 0.9 %
5-10 SYRINGE (ML) INJECTION EVERY 8 HOURS
Status: DISCONTINUED | OUTPATIENT
Start: 2018-02-05 | End: 2018-02-05 | Stop reason: HOSPADM

## 2018-02-05 RX ORDER — ONDANSETRON 2 MG/ML
INJECTION INTRAMUSCULAR; INTRAVENOUS AS NEEDED
Status: DISCONTINUED | OUTPATIENT
Start: 2018-02-05 | End: 2018-02-05 | Stop reason: HOSPADM

## 2018-02-05 RX ORDER — OXYCODONE AND ACETAMINOPHEN 5; 325 MG/1; MG/1
1 TABLET ORAL
Status: DISCONTINUED | OUTPATIENT
Start: 2018-02-05 | End: 2018-02-07 | Stop reason: HOSPADM

## 2018-02-05 RX ORDER — OXYTOCIN/RINGER'S LACTATE 20/1000 ML
PLASTIC BAG, INJECTION (ML) INTRAVENOUS
Status: COMPLETED
Start: 2018-02-05 | End: 2018-02-05

## 2018-02-05 RX ORDER — DIPHENHYDRAMINE HCL 25 MG
25 CAPSULE ORAL
Status: DISCONTINUED | OUTPATIENT
Start: 2018-02-05 | End: 2018-02-07 | Stop reason: HOSPADM

## 2018-02-05 RX ORDER — CEFAZOLIN SODIUM/WATER 2 G/20 ML
2 SYRINGE (ML) INTRAVENOUS ONCE
Status: DISCONTINUED | OUTPATIENT
Start: 2018-02-05 | End: 2018-02-05 | Stop reason: HOSPADM

## 2018-02-05 RX ORDER — SODIUM CHLORIDE 0.9 % (FLUSH) 0.9 %
5-10 SYRINGE (ML) INJECTION EVERY 8 HOURS
Status: DISCONTINUED | OUTPATIENT
Start: 2018-02-05 | End: 2018-02-07 | Stop reason: HOSPADM

## 2018-02-05 RX ORDER — IBUPROFEN 400 MG/1
800 TABLET ORAL EVERY 8 HOURS
Status: DISCONTINUED | OUTPATIENT
Start: 2018-02-05 | End: 2018-02-07 | Stop reason: HOSPADM

## 2018-02-05 RX ORDER — ONDANSETRON 2 MG/ML
4 INJECTION INTRAMUSCULAR; INTRAVENOUS
Status: ACTIVE | OUTPATIENT
Start: 2018-02-05 | End: 2018-02-06

## 2018-02-05 RX ORDER — OXYTOCIN/RINGER'S LACTATE 20/1000 ML
PLASTIC BAG, INJECTION (ML) INTRAVENOUS
Status: DISCONTINUED | OUTPATIENT
Start: 2018-02-05 | End: 2018-02-05 | Stop reason: HOSPADM

## 2018-02-05 RX ADMIN — EPHEDRINE SULFATE 10 MG: 50 INJECTION, SOLUTION INTRAVENOUS at 08:51

## 2018-02-05 RX ADMIN — KETOROLAC TROMETHAMINE 30 MG: 30 INJECTION, SOLUTION INTRAMUSCULAR at 20:08

## 2018-02-05 RX ADMIN — BUPIVACAINE HYDROCHLORIDE 12 MG: 7.5 INJECTION, SOLUTION EPIDURAL; RETROBULBAR at 08:16

## 2018-02-05 RX ADMIN — SODIUM CHLORIDE, POTASSIUM CHLORIDE, SODIUM LACTATE AND CALCIUM CHLORIDE: 600; 310; 30; 20 INJECTION, SOLUTION INTRAVENOUS at 07:54

## 2018-02-05 RX ADMIN — KETOROLAC TROMETHAMINE 30 MG: 30 INJECTION, SOLUTION INTRAMUSCULAR at 11:01

## 2018-02-05 RX ADMIN — SODIUM CHLORIDE, POTASSIUM CHLORIDE, SODIUM LACTATE AND CALCIUM CHLORIDE: 600; 310; 30; 20 INJECTION, SOLUTION INTRAVENOUS at 08:24

## 2018-02-05 RX ADMIN — EPHEDRINE SULFATE 10 MG: 50 INJECTION, SOLUTION INTRAVENOUS at 08:21

## 2018-02-05 RX ADMIN — EPHEDRINE SULFATE 10 MG: 50 INJECTION, SOLUTION INTRAVENOUS at 08:32

## 2018-02-05 RX ADMIN — ACETAMINOPHEN 1000 MG: 10 INJECTION, SOLUTION INTRAVENOUS at 08:49

## 2018-02-05 RX ADMIN — EPHEDRINE SULFATE 5 MG: 50 INJECTION, SOLUTION INTRAVENOUS at 08:46

## 2018-02-05 RX ADMIN — SODIUM CHLORIDE, SODIUM LACTATE, POTASSIUM CHLORIDE, AND CALCIUM CHLORIDE 125 ML/HR: 600; 310; 30; 20 INJECTION, SOLUTION INTRAVENOUS at 11:38

## 2018-02-05 RX ADMIN — MORPHINE SULFATE 0.2 MCG: 0.5 INJECTION, SOLUTION EPIDURAL; INTRATHECAL; INTRAVENOUS at 08:16

## 2018-02-05 RX ADMIN — Medication 2 G: at 08:16

## 2018-02-05 RX ADMIN — ONDANSETRON 4 MG: 2 INJECTION INTRAMUSCULAR; INTRAVENOUS at 08:49

## 2018-02-05 RX ADMIN — EPHEDRINE SULFATE 10 MG: 50 INJECTION, SOLUTION INTRAVENOUS at 08:47

## 2018-02-05 RX ADMIN — EPHEDRINE SULFATE 5 MG: 50 INJECTION, SOLUTION INTRAVENOUS at 08:23

## 2018-02-05 RX ADMIN — Medication 10 ML: at 20:08

## 2018-02-05 RX ADMIN — Medication: at 08:44

## 2018-02-05 NOTE — LACTATION NOTE
This note was copied from a baby's chart.   1201:   Couplet Interdisciplinary Rounds     MATERNAL    Daily Goal:     Influenza screening completed: YES   Tdap screening completed: YES   Rhogam Given:N/A  MMR Given:N/A    VTE Prophylaxis: Mechanical    EPDS: not completed yet          Patient Name: Annette Gottron Diagnosis:   Single live    Date of Admission: 2018 LOS: 0  Gestational Age: Gestational Age: 39w1d       Daily Goal:     Birth Weight: 2.98 kg Current Weight: Weight: 2.98 kg (6-9)  % of Weight Change: 0%    Feeding:   Metabolic Screen: NO    Hepatitis B:  NO    Discharge Bili:  NO  Car Seat Trial, if needed:  N/A      Patient/Family Teaching Needs: Infant care    Days before discharge: Two or more days before discharge    In Attendance:  Nursing and Physician

## 2018-02-05 NOTE — IP AVS SNAPSHOT
[address] [address] 
[phone] Patient: Asia Webb MRN: [id number] :1990 A check taniya indicates which time of day the medication should be taken. My Medications START taking these medications Instructions Each Dose to Equal  
 Morning Noon Evening Bedtime  
 ibuprofen 800 mg tablet Commonly known as:  MOTRIN Your last dose was: Your next dose is: Take 1 Tab by mouth every eight (8) hours as needed for Pain. 800 mg  
    
   
   
   
  
 simethicone 80 mg chewable tablet Commonly known as:  Sophia Villalobos Your last dose was: Your next dose is: Take 1 Tab by mouth four (4) times daily as needed. 80 mg CONTINUE taking these medications Instructions Each Dose to Equal  
 Morning Noon Evening Bedtime PNV No12-Iron-FA-DSS-OM-3 29 mg iron-1 mg -50 mg Cpkd Your last dose was: Your next dose is: Take 1 Tab by mouth daily. 1 Tab ZANTAC 75 75 mg tablet Generic drug:  raNITIdine Your last dose was: Your next dose is: Take 75 mg by mouth daily. 75 mg Where to Get Your Medications Information on where to get these meds will be given to you by the nurse or doctor. ! Ask your nurse or doctor about these medications  
  ibuprofen 800 mg tablet  
 simethicone 80 mg chewable tablet

## 2018-02-05 NOTE — IP AVS SNAPSHOT
[facility] [address] [place] [address] 
[phone] Patient: Keyana García MRN: [id number] :1990 About your hospitalization You were admitted on:  2018 You last received care in the:  [address] [facility] You were discharged on:  2018 Why you were hospitalized Your primary diagnosis was:  Not on File Your diagnoses also included:  39 Weeks Gestation Of Pregnancy Follow-up Information Follow up With Details Comments Contact Info Kristina Murphy MD   15[facility] [address] [place] [address] 
[phone] [address] Call As needed for breastfeeding assistance [address] [place] [address], [address] 
[phone] Kristina Murphy MD Schedule an appointment as soon as possible for a visit in 1 week to have staples removed from incision 15[facility] [address] [place] [address] 
[phone] Discharge Orders None A check taniya indicates which time of day the medication should be taken. My Medications START taking these medications Instructions Each Dose to Equal  
 Morning Noon Evening Bedtime  
 ibuprofen 800 mg tablet Commonly known as:  MOTRIN Your last dose was: Your next dose is: Take 1 Tab by mouth every eight (8) hours as needed for Pain. 800 mg  
    
   
   
   
  
 simethicone 80 mg chewable tablet Commonly known as:  Eletha Lango Your last dose was: Your next dose is: Take 1 Tab by mouth four (4) times daily as needed. 80 mg CONTINUE taking these medications Instructions Each Dose to Equal  
 Morning Noon Evening Bedtime PNV No12-Iron-FA-DSS-OM-3 29 mg iron-1 mg -50 mg Cpkd Your last dose was: Your next dose is: Take 1 Tab by mouth daily. 1 Tab ZANTAC 75 75 mg tablet Generic drug:  raNITIdine Your last dose was: Your next dose is: Take 75 mg by mouth daily. 75 mg Where to Get Your Medications Information on where to get these meds will be given to you by the nurse or doctor. ! Ask your nurse or doctor about these medications  
  ibuprofen 800 mg tablet  
 simethicone 80 mg chewable tablet Discharge Instructions POSTPARTUM DISCHARGE INSTRUCTIONS Name:  Jatin Light YOB: 1990 Admission Diagnosis:  REPEAT , SPEAKS Belarusian 
39 weeks gestation of pregnancy Discharge Diagnosis:   
Problem List as of 2018  Date Reviewed: 2018 Codes Class Noted - Resolved 39 weeks gestation of pregnancy ICD-10-CM: Z3A.39 
ICD-9-CM: V22.2  2018 - Present Attending Physician:  Lizet Carcamo MD 
 
Delivery Type:   Section: What to Expect at [facility] Your Recovery: A  section, or , is surgery to deliver your baby through a cut, called an incision that the doctor makes in your lower belly and uterus. You may have some pain in your lower belly and need pain medicine for 1 to 2 weeks. You can expect some vaginal bleeding for several weeks. You will probably need about 6 weeks to fully recover. It is important to take it easy while the incision is healing. Avoid heavy lifting, strenuous activities, or exercises that strain the belly muscles while you are recovering. Ask a family member or friend for help with housework, cooking, and shopping. This care sheet gives you a general idea about how long it will take for you to recover. But each person recovers at a different pace. Follow the steps below to get better as quickly as possible. How can you care for yourself at home? Activity · Rest when you feel tired. Getting enough sleep will help you recover. · Try to walk each day. Start by walking a little more than you did the day before. Bit by bit, increase the amount you walk. Walking boosts blood flow and helps prevent pneumonia, constipation, and blood clots. · Avoid strenuous activities, such as bicycle riding, jogging, weightlifting, and aerobic exercise, for 6 weeks or until your doctor says it is okay. · Until your doctor says it is okay, do not lift anything heavier than your baby. · Do not do sit-ups or other exercise that strain the belly muscles for 6 weeks or until your doctor says it is okay. · Hold a pillow over your incision when you cough or take deep breaths. This will support your belly and decrease your pain. · You may shower as usual. Pat the incision dry when you are done. · You will have some vaginal bleeding. Wear sanitary pads. Do not douche or use tampons until your doctor says it is okay. · Ask your doctor when you can drive again. · You will probably need to take at least 6 weeks off work. It depends on the type of work you do and how you feel. · Wait until you are healed (about 4 to 6 weeks) before you have sexual intercourse. Your doctor will tell you when it is okay to have sex. · Talk to your doctor about birth control. You can get pregnant even before your period returns. Also, you can get pregnant while you are breast-feeding. Incision care Your skin is your body's first line of defense against germs, but an incision site leaves an easy way for germs to enter your body. To prevent infection: · Clean your hands frequently and before and after changing any touching any dressings. · If you have strips of tape on the incision, leave the tape on for a week or until it falls off. · Look at your incision closely every day for any changes. Contact your doctor if you experience any signs of infection, such as fever or increased redness at the surgical site. · Wash the area daily with warm, soapy water, and pat it dry. Don't use hydrogen peroxide or alcohol, which can slow healing. You may cover the area with a gauze bandage if it weeps or rubs against clothing. Change the bandage every day. · Keep the area clean and dry. Diet · You can eat your normal diet. If your stomach is upset, try bland, low-fat foods like plain rice, broiled chicken, toast, and yogurt. · Drink plenty of fluids (unless your doctor tells you not to). · You may notice that your bowel movements are not regular right after your surgery. This is common. Try to avoid constipation and straining with bowel movements. You may want to take a fiber supplement every day. If you have not had a bowel movement after a couple of days, ask your doctor about taking a mild laxative. · If you are breast-feeding, do not drink any alcohol. Medicines · Take pain medicines exactly as directed. · If the doctor gave you a prescription medicine for pain, take it as prescribed. · If you are not taking a prescription pain medicine, ask your doctor if you can take an over-the-counter medicine such as acetaminophen (Tylenol), ibuprofen (Advil, Motrin), or naproxen (Aleve), for cramps. Read and follow all instructions on the label. Do not take aspirin, because it can cause more bleeding. Do not take acetaminophen (Tylenol) and other acetaminophen containing medications (i.e. Percocet) at the same time. · If you think your pain medicine is making you sick to your stomach: 
· Take your medicine after meals (unless your doctor has told you not to). · Ask your doctor for a different pain medicine. · If your doctor prescribed antibiotics, take them as directed. Do not stop taking them just because you feel better. You need to take the full course of antibiotics. Mental Health · Many women get the \"baby blues\" during the first few days after childbirth. You may lose sleep, feel irritable, and cry easily.  You may feel happy one minute and sad the next. Hormone changes are one cause of these emotional changes. Also, the demands of a new baby, along with visits from relatives or other family needs, add to a mother's stress. The \"baby blues\" often peak around the fourth day. Then they ease up in less than 2 weeks. · If your moodiness or anxiety lasts for more than 2 weeks, or if you feel like life is not worth living, you may have postpartum depression. This is different for each mother. Some mothers with serious depression may worry intensely about their infant's well-being. Others may feel distant from their child. Some mothers might even feel that they might harm their baby. A mother may have signs of paranoia, wondering if someone is watching her. · With all the changes in your life, you may not know if you are depressed. Pregnancy sometimes causes changes in how you feel that are similar to the symptoms of depression. · Symptoms of depression include: · Feeling sad or hopeless and losing interest in daily activities. These are the most common symptoms of depression. · Sleeping too much or not enough. · Feeling tired. You may feel as if you have no energy. · Eating too much or too little. · POSTPARTUM SUPPORT INTERNATIONAL (PSI) offers a Warm line; Chat with the Expert phone sessions; Information and Articles about Pregnancy and Postpartum Mood Disorders; Comprehensive List of Free Support Groups; Knowledgeable local coordinators who will offer support, information, and resources; Guide to Resources on Shozu; Calendar of events in the  mood disorders community; Latest News and Research; and [facility] [address] for [employer]. Remember - You are not alone; You are not to blame; With help, you will be well. 1-341-053-PPD(1861). WWW. POSTPARTUM. NET · Writing or talking about death, such as writing suicide notes or talking about guns, knives, or pills. Keep the numbers for these national suicide hotlines: 1-048-296-TALK (0-973.486.5423) and 5-497-VGZWCHB (2-355.754.5619). If you or someone you know talks about suicide or feeling hopeless, get help right away. Other instructions · If you breast-feed your baby, you may be more comfortable while you are healing if you place the baby so that he or she is not resting on your belly. Try tucking your baby under your arm, with his or her body along the side you will be feeding on. Support your baby's upper body with your arm. With that hand you can control your baby's head to bring his or her mouth to your breast. This is sometimes called the football hold. Follow-up care is a key part of your treatment and safety. Be sure to make and go to all appointments, and call your doctor if you are having problems. It's also a good idea to know your test results and keep a list of the medicines you take. When should you call for help? Call 911 anytime you think you may need emergency care. For example, call if: 
· You are thinking of hurting yourself, your baby, or anyone else. · You passed out (lost consciousness). · You have symptoms of a blood clot in your lung (called a pulmonary embolism). These may include: 
· Sudden chest pain. · Trouble breathing. · Coughing up blood. Call your doctor now or seek immediate medical care if: 
 
· You have severe vaginal bleeding. · You are soaking through a pad each hour for 2 or more hours. · Your vaginal bleeding seems to be getting heavier or is still bright red 4 days after delivery. · You are dizzy or lightheaded, or you feel like you may faint. · You are vomiting or cannot keep fluids down. · You have a fever. · You have new or more belly pain. · You have loose stitches, or your incision comes open. · You have symptoms of infection, such as: 
· Increased pain, swelling, warmth, or redness. · Red streaks leading from the incision. · Pus draining from the incision. · A fever · You pass tissue (not just blood). · Your vaginal discharge smells bad. · Your belly feels tender or full and hard. · Your breasts are continuously painful or red. · You feel sad, anxious, or hopeless for more than a few days. · You have sudden, severe pain in your belly. · You have symptoms of a blood clot in your leg (called a deep vein thrombosis), such as: 
· Pain in your calf, back of the knee, thigh, or groin. · Redness and swelling in your leg or groin. · You have symptoms of preeclampsia, such as: 
· Sudden swelling of your face, hands, or feet. · New vision problems (such as dimness or blurring). · A severe headache. · Your blood pressure is higher than it should be or rises suddenly. · You have new nausea or vomiting. Watch closely for changes in your health, and be sure to contact your doctor if you have any problems. Additional Information:  Preventing Infection at Home We care about preventing infection and avoiding the spread of germs - not only when you are in the hospital but also when you return home. When you return home from the hospital, it's important to take the following steps to help prevent infection and avoid spreading germs that could infect you and others. Ask everyone in your home to follow these guidelines, too. Clean Your Hands · Clean your hands whenever your hands are visibly dirty, before you eat, before or after touching your mouth, nose or eyes, and before preparing food. Clean them after contact with body fluids, using the restroom, touching animals or changing diapers. · When washing hands, wet them with warm water and work up a lather. Rub hands for at least 15 seconds, then rinse them and pat them dry with a clean towel or paper towel.  
· When using hand sanitizers, it should take about 15 seconds to rub your hands dry. If not, you probably didn't apply enough . Cover Your Sneeze or Cough Germs are released into the air whenever you sneeze or cough. To prevent the spread of infection: · Turn away from other people before coughing or sneezing. · Cover your mouth or nose with a tissue when you cough or sneeze. Put the tissue in the trash. · If you don't have a tissue, cough or sneeze into your upper sleeve, not your hands. · Always clean your hands after coughing or sneezing. Care for Wounds Your skin is your body's first line of defense against germs, but an open wound leaves an easy way for germs to enter your body. To prevent infection: · Clean your hands before and after changing wound dressings, and wear gloves to change dressings if recommended by your doctor. · Take special care with IV lines or other devices inserted into the body. If you must touch them, clean your hands first. 
· Follow any specific instructions from your doctor to care for your wounds. Contact your doctor if you experience any signs of infection, such as fever or increased redness at the surgical or wound site. Keep a [place] [address] · Clean or wipe commonly touched hard surfaces like door handles, sinks, tabletops, phones and TV remotes. · Use products labeled \"disinfectant\" to kill harmful bacteria and viruses. · Use a clean cloth or paper towel to clean and dry surfaces. Wiping surfaces with a dirty dishcloth, sponge or towel will only spread germs. · Never share toothbrushes, castañeda, drinking glasses, utensils, razor blades, face cloths or bath towels to avoid spreading germs. · Be sure that the linens that you sleep on are clean. · Keep pets away from wounds and wash your hands after touching pets, their toys or bedding. We care about you and your health. Remember, preventing infections is a  
team effort between you, your family, friends and health care providers. Postpartum Support PARENTS:  Are you feeling sad or depressed? Is it difficult for you to enjoy yourself? Do you feel more irritable or tense? Do you feel anxious or panicky? Are you having difficulty bonding with your baby? Do you feel as if you are \"out of control\" or \"going crazy\"? Are you worried that you might hurt your baby or yourself? FAMILIES: Do you worry that something is wrong but don't know how to help? Do you think that your partner or spouse is having problems coping? Are you worried that it may never get better? While many women experience some mild mood change or \"the blues\" during or after the birth of a child, 1 in 9 women experience more significant symptoms of depression or anxiety. 1 in 10 Dads become depressed during the first year. Things you can do Being a good parent includes taking care of yourself. If you take care of yourself, you will be able to take better care of your baby and your family. · Talk to a counselor or healthcare provider who has training in  mood and anxiety problems. · Learn as much as you can about pregnancy and postpartum depression and anxiety. · Get support from family and friends. Ask for help when you need it. · Join a support group in your area or online. · Keep active by walking, stretching or whatever form of exercise helps you to feel better. · Get enough rest and time for yourself. · Eat a healthy diet. · Don't give up! It may take more than one try to get the right help you need. These are general instructions for a healthy lifestyle: No smoking/ No tobacco products/ Avoid exposure to second hand smoke Surgeon General's Warning:  Quitting smoking now greatly reduces serious risk to your health. Obesity, smoking, and sedentary lifestyle greatly increases your risk for illness A healthy diet, regular physical exercise & weight monitoring are important for maintaining a healthy lifestyle Recognize signs and symptoms of STROKE: 
 F-face looks uneven A-arms unable to move or move unevenly S-speech slurred or non-existent T-time-call 911 as soon as signs and symptoms begin - DO NOT go  
    back to bed or wait to see if you get better - TIME IS BRAIN. I have had the opportunity to make my options or choices for discharge. I have received and understand these instructions. [employer] Announcement We are excited to announce that we are making your provider's discharge notes available to you in [employer]. You will see these notes when they are completed and signed by the physician that discharged you from your recent hospital stay. If you have any questions or concerns about any information you see in [employer], please call the Health Information Department where you were seen or reach out to your Primary Care Provider for more information about your plan of care. Introducing [facility] [place] [facility]! [facility] introduces [employer] patient portal. Now you can access parts of your medical record, email your doctor's office, and request medication refills online. 1. In your internet browser, go to [email or web address] 2. Click on the First Time User? Click Here link in the Sign In box. You will see the New Member Sign Up page. 3. Enter your [employer] Access Code exactly as it appears below. You will not need to use this code after youve completed the sign-up process. If you do not sign up before the expiration date, you must request a new code. · [employer] Access Code: [email or web address] Expires: 5/3/2018 12:00 PM 
 
4. Enter the last four digits of your Social Security Number (xxxx) and Date of Birth (mm/dd/yyyy) as indicated and click Submit. You will be taken to the next sign-up page. 5. Create a [email or web address] ID. This will be your [employer] login ID and cannot be changed, so think of one that is secure and easy to remember. 6. Create a [email or web address] password. You can change your password at any time. 7. Enter your Password Reset Question and Answer. This can be used at a later time if you forget your password. 8. Enter your e-mail address. You will receive e-mail notification when new information is available in [address] 9. Click Sign Up. You can now view and download portions of your medical record. 10. Click the Download Summary menu link to download a portable copy of your medical information. If you have questions, please visit the Frequently Asked Questions section of the [employer] website. Remember, [employer]Access is NOT to be used for urgent needs. For medical emergencies, dial 911. Now available from your iPhone and Android! Providers Seen During Your Hospitalization Provider Specialty Primary office phone Kristina Murphy MD Gynecology [phone] Your Primary Care Physician (PCP) Primary Care Physician Office Phone Office Fax Chastity Navarro [phone] You are allergic to the following No active allergies Recent Documentation Height Weight Breastfeeding? BMI OB Status Smoking Status 1.549 m 72.6 kg Unknown 30.23 kg/m2 Recent pregnancy Never Smoker Emergency Contacts Name Discharge Info Relation Home Work Mobile [address] [place] [address] CAREGIVER [3] Other Relative [6] [phone] Patient Belongings The following personal items are in your possession at time of discharge: 
  Dental Appliances: None  Visual Aid: None      Home Medications: None   Jewelry: With patient  Clothing: At bedside, With patient    Other Valuables: Cell Phone Please provide this summary of care documentation to your next provider. Signatures-by signing, you are acknowledging that this After Visit Summary has been reviewed with you and you have received a copy. Patient Signature:  ____________________________________________________________ Date:  ____________________________________________________________  
  
Curlie Ruths Provider Signature:  ____________________________________________________________ Date:  ____________________________________________________________

## 2018-02-05 NOTE — PROCEDURES
Section Delivery Operative Report     Date of Surgery: 2018     Preoperative Diagnosis: 2 prior CS, 39 weeks    Postoperative Diagnosis:same delivered viable female infant    Procedure: Procedure(s):  Repeat low transverse  SECTION via pfannenstiel incision    Surgeon(s) and Role:     * Jasmin Baptiste MD - Primary  Assist - Paul Muniz RN     Anesthesia: Spinal    Findings: Viable female infant in vertex position. Clear fluid. Apgars 9/10. Weight pending. Dense fascial adhesions but no intraperitoneal adhesions. Normal tubes/ovaries, placenta. Procedure Detail:    The patient was taken to the operating room, where spinal anesthesia was found to be adequate. The patient was prepped and draped in the normal sterile fashion. Pfannenstiel skin incision was made with the scalpel and carried down to the underlying fascia. The fascial incision was extended laterally with Goff scissors. This fascial incision was grasped with Kocher clamps, tented up, and the underlying rectus muscles were dissected bluntly. The inferior edge of the rectus fascia was grasped with Kocher clamps, tented up, and the underlying rectus muscle was dissected off bluntly. The rectus muscle was divided in the midline bluntly. The peritoneum was entered bluntly with hemostat and extended inferiorly and superiorly with Metzenbaum scissors. The bladder blade was then inserted. The vesicouterine and peritoneum was identified, grasped with pick-ups and entered sharply with Metzenbaum scissors. The bladder flap was then created digitally and the bladder blade was reinserted. A low transverse uterine incision was made with the scalpel and extended laterally with blunt finger dissection. The babys head was then delivered atraumatically. The nose and mouth were suctioned. The cord was clamped and cut and the baby was handed off to the waiting  care unit staff. Placenta was then delivered spontaneously.  The uterus was exteriorized and cleared of all clots and debris. The uterine incision was closed in two layers. The first layer with running locked layer of 0 monocryl. The second layer was an imbricating layer of 0 monocryl with good hemostasis assured. The pelvis was washed with warm normal saline. Good hemostasis was again reassured. The paracolic gutters were washed with warm normal saline and the uterus was returned to the abdomen. Good hemostasis was again reassured throughout. Bovie cautery was applied to cut rectus muscle edges for hemostasis. The fascia was closed with 0 Vicryl in a running fashion. Good hemostasis was assured. The skin was closed with staples. The patient tolerated the procedure well. Sponge, lap, and needle counts were correct times two and the patient was taken to recovery in stable condition.       Estimated Blood Loss:  800    Specimens:   ID Type Source Tests Collected by Time Destination   1 : placenta Placenta Uterus  Marck Jiang MD 2018 4090 Discarded        Cord Blood Results:  Information for the patient's :  Ceil Mcardle [[id number]]   No results found for: PCTABR, PCTDIG, BILI, ABORH    No results found for: APH, APCO2, APO2, AHCO3, ABEC, ABDC, O2ST, SITE, RSCOM     Prenatal Labs:  Lab Results   Component Value Date/Time    ABO/Rh(D) B POSITIVE 2018 11:02 AM    HBsAg, External Non reactive 2017    HIV, External Non reactive 2017    Rubella, External Immune 2017    RPR, External Non reactive 2017    GrBStrep, External Negative 2018        Signed By:  Marck Jiang MD     2018

## 2018-02-05 NOTE — H&P
History & Physical    Name: William Morgan MRN: [id number]  SSN: [id number]    YOB: 1990  Age: 33 y.o. Sex: female      Subjective:     Estimated Date of Delivery: 18  OB History    Para Term  AB Living   3 2 2 0 0 2   SAB TAB Ectopic Molar Multiple Live Births   0 0 0 0 0 2      # Outcome Date GA Lbr Moose/2nd Weight Sex Delivery Anes PTL Lv   3 Current            2 Term            1 Term                   Ms. Claudy Ugalde admitted with pregnancy at 36w3d for  section due to two prior  sections. Prenatal course was normal. Please see prenatal records for details. History reviewed. No pertinent past medical history. Past Surgical History:   Procedure Laterality Date     DELIVERY ONLY       and     HX  SECTION       Social History     Occupational History    Not on file. Social History Main Topics    Smoking status: Never Smoker    Smokeless tobacco: Never Used    Alcohol use No    Drug use: No    Sexual activity: Yes     Partners: Male     History reviewed. No pertinent family history. No Known Allergies  Prior to Admission medications    Medication Sig Start Date End Date Taking? Authorizing Provider   PNV No12-Iron-FA-DSS-OM-3 29 mg iron-1 mg -50 mg CPKD Take 1 Tab by mouth daily. Yes Historical Provider   raNITIdine (ZANTAC 75) 75 mg tablet Take 75 mg by mouth daily. Yes Historical Provider        Review of Systems: A comprehensive review of systems was negative except for that written in the History of Present Illness. Objective:     Vitals:  Vitals:    18 0557 18 0627   BP:  112/71   Pulse:  77   Resp:  14   Temp:  97.9 °F (36.6 °C)   Weight: 72.6 kg (160 lb)    Height: 5' 1\" (1.549 m)         Physical Exam:  Patient without distress.   Heart: Regular rate and rhythm  Lung: clear to auscultation throughout lung fields, no wheezes, no rales, no rhonchi and normal respiratory effort  Abdomen: soft, nontender  Fundus: soft and non tender  Lower Extremities:  - Edema No  Membranes:  Intact  Fetal Heart Rate: reassuring and reactive    Prenatal Labs:   Lab Results   Component Value Date/Time    ABO/Rh(D) B POSITIVE 2018 11:02 AM    Rubella, External Immune 2017    GrBStrep, External Negative 2018    HBsAg, External Non reactive 2017    HIV, External Non reactive 2017    RPR, External Non reactive 2017    ABO,Rh B Positive 2017         Impression/Plan:     Plan:  Admit for  section. Discussed the risks of surgery including the risks of bleeding, infection, deep vein thrombosis, and surgical injuries to internal organs including but not limited to the bowels, bladder, rectum, and female reproductive organs. The patient understands the risks; any and all questions were answered to the patient's satisfaction.     Signed By:  Adelita Harris MD     2018

## 2018-02-05 NOTE — ROUTINE PROCESS
1130: TRANSFER - IN REPORT:    Bedside & Verbal report received from Ernie Lee RN (name) on Nonzurdo Balding  being received from L&D (unit) for routine post - op      Report consisted of patients Situation, Background, Assessment and   Recommendations(SBAR). Information from the following report(s) SBAR and Intake/Output was reviewed with the receiving nurse. Opportunity for questions and clarification was provided. Assessment completed upon patients arrival to unit and care assumed.

## 2018-02-05 NOTE — PROGRESS NOTES
2018  5:56 AM Pt received for Repeat  section. PAT reviewed  0650 PIV and labs, Pt education for OR procedures  0810 Walked to OR 1 per Dr Yumiko Higgins and Mini Mabry  1130 TRANSFER - OUT REPORT:    Verbal report given to TATYANA Dias(name) on Asia Webb  being transferred to 330 MIU(unit) for routine post - op       Report consisted of patients Situation, Background, Assessment and   Recommendations(SBAR). Information from the following report(s) SBAR, Kardex, OR Summary, Procedure Summary, Intake/Output, MAR, Accordion, Recent Results and Med Rec Status was reviewed with the receiving nurse. Lines:   Peripheral IV 18 Left; Lower Arm (Active)   Site Assessment Clean, dry, & intact 2018  7:23 AM   Phlebitis Assessment 0 2018  7:23 AM   Infiltration Assessment 0 2018  7:23 AM   Dressing Status Clean, dry, & intact 2018  7:23 AM   Dressing Type Transparent 2018  7:23 AM   Hub Color/Line Status Green 2018  7:23 AM        Opportunity for questions and clarification was provided.       Patient transported with:   Registered Nurse

## 2018-02-05 NOTE — ANESTHESIA POSTPROCEDURE EVALUATION
Post-Anesthesia Evaluation and Assessment    Patient: Rodger Moss MRN: [id number]  SSN: [id number]    YOB: 1990  Age: 33 y.o. Sex: female       Cardiovascular Function/Vital Signs  Visit Vitals    BP (!) 89/47    Pulse 87    Temp 36.4 °C (97.5 °F)    Resp 16    Ht 5' 1\" (1.549 m)    Wt 72.6 kg (160 lb)    SpO2 98%    Breastfeeding Unknown    BMI 30.23 kg/m2       Patient is status post spinal anesthesia for Procedure(s):   SECTION. Nausea/Vomiting: None    Postoperative hydration reviewed and adequate. Pain:  Pain Scale 1: Numeric (0 - 10) (18)  Pain Intensity 1: 0 (18)   Managed    Neurological Status:   Neuro (WDL): Within Defined Limits (18)   At baseline    Mental Status and Level of Consciousness: Arousable    Pulmonary Status:   O2 Device: Room air (18 806)   Adequate oxygenation and airway patent    Complications related to anesthesia: None    Post-anesthesia assessment completed.  No concerns    Signed By: Emerson Simeon MD     2018

## 2018-02-06 LAB
BASOPHILS # BLD: 0 K/UL (ref 0–0.1)
BASOPHILS NFR BLD: 1 % (ref 0–1)
DIFFERENTIAL METHOD BLD: ABNORMAL
EOSINOPHIL # BLD: 0.1 K/UL (ref 0–0.4)
EOSINOPHIL NFR BLD: 1 % (ref 0–7)
ERYTHROCYTE [DISTWIDTH] IN BLOOD BY AUTOMATED COUNT: 13.5 % (ref 11.5–14.5)
HCT VFR BLD AUTO: 29.2 % (ref 35–47)
HGB BLD-MCNC: 9.9 G/DL (ref 11.5–16)
IMM GRANULOCYTES # BLD: 0 K/UL (ref 0–0.04)
IMM GRANULOCYTES NFR BLD AUTO: 0 % (ref 0–0.5)
LYMPHOCYTES # BLD: 1.2 K/UL (ref 0.8–3.5)
LYMPHOCYTES NFR BLD: 20 % (ref 12–49)
MCH RBC QN AUTO: 30.1 PG (ref 26–34)
MCHC RBC AUTO-ENTMCNC: 33.9 G/DL (ref 30–36.5)
MCV RBC AUTO: 88.8 FL (ref 80–99)
MONOCYTES # BLD: 0.6 K/UL (ref 0–1)
MONOCYTES NFR BLD: 10 % (ref 5–13)
NEUTS SEG # BLD: 3.9 K/UL (ref 1.8–8)
NEUTS SEG NFR BLD: 69 % (ref 32–75)
NRBC # BLD: 0 K/UL (ref 0–0.01)
NRBC BLD-RTO: 0 PER 100 WBC
PLATELET # BLD AUTO: 129 K/UL (ref 150–400)
PMV BLD AUTO: 10.3 FL (ref 8.9–12.9)
RBC # BLD AUTO: 3.29 M/UL (ref 3.8–5.2)
WBC # BLD AUTO: 5.7 K/UL (ref 3.6–11)

## 2018-02-06 PROCEDURE — 65410000002 HC RM PRIVATE OB

## 2018-02-06 PROCEDURE — 36415 COLL VENOUS BLD VENIPUNCTURE: CPT | Performed by: OBSTETRICS & GYNECOLOGY

## 2018-02-06 PROCEDURE — 77030011943

## 2018-02-06 PROCEDURE — 85025 COMPLETE CBC W/AUTO DIFF WBC: CPT | Performed by: OBSTETRICS & GYNECOLOGY

## 2018-02-06 PROCEDURE — 74011250637 HC RX REV CODE- 250/637: Performed by: OBSTETRICS & GYNECOLOGY

## 2018-02-06 PROCEDURE — 74011250636 HC RX REV CODE- 250/636: Performed by: ANESTHESIOLOGY

## 2018-02-06 RX ADMIN — KETOROLAC TROMETHAMINE 30 MG: 30 INJECTION, SOLUTION INTRAMUSCULAR at 01:36

## 2018-02-06 RX ADMIN — IBUPROFEN 800 MG: 400 TABLET, FILM COATED ORAL at 10:06

## 2018-02-06 RX ADMIN — SIMETHICONE CHEW TAB 80 MG 80 MG: 80 TABLET ORAL at 23:12

## 2018-02-06 RX ADMIN — IBUPROFEN 800 MG: 400 TABLET, FILM COATED ORAL at 18:34

## 2018-02-06 NOTE — LACTATION NOTE
This note was copied from a baby's chart.   1354:   Couplet Interdisciplinary Rounds     MATERNAL    Daily Goal:     Influenza screening completed: YES   Tdap screening completed: YES   Rhogam Given:N/A  MMR Given:N/A    VTE Prophylaxis: Mechanical    EPDS: not completed yet          Patient Name: Amy Das Diagnosis: [place]  Single live    Date of Admission: 2018 LOS: 1  Gestational Age: Gestational Age: 39w1d       Daily Goal:     Birth Weight: 2.98 kg Current Weight: Weight: 2.815 kg (6-3)  % of Weight Change: -6%    Feeding: Breastfeeding and Bottle (Similac)   Metabolic Screen: NO    Hepatitis B:  NO    Discharge Bili:  NO  Car Seat Trial, if needed:  N/A      Patient/Family Teaching Needs: Infant care    Days before discharge: Two or more days before discharge    In Attendance:  Lactation Consultant and Nursing

## 2018-02-06 NOTE — ROUTINE PROCESS
3352: Bedside and Verbal shift change report given to ALVARADO Espitia, RN (oncoming nurse) by Ruby Kanner. Smith-Harrison, RN (offgoing nurse). Report included the following information SBAR.

## 2018-02-06 NOTE — PROGRESS NOTES
Post operative day number one after surgery with Duramorph for post op pain. good overnight pain relief. continue current Duramorph order protocol.

## 2018-02-06 NOTE — PROGRESS NOTES
Post-Operative  Day 1    Marcus Kemp         Information for the patient's :  Frankie León [[id number]]   , Low Transverse   Patient doing well without unusual complaints. Tolerating liquids, no flatus. Pt was unable to void overnight- needed straight cath. Has not tried again this morning. Vitals:  Visit Vitals    BP 90/54 (BP 1 Location: Right arm, BP Patient Position: At rest)    Pulse 84    Temp 98.1 °F (36.7 °C)    Resp 16    Ht 5' 1\" (1.549 m)    Wt 72.6 kg (160 lb)    LMP 2017 (Exact Date)    SpO2 99%    Breastfeeding Unknown    BMI 30.23 kg/m2     Temp (24hrs), Av °F (36.7 °C), Min:97.5 °F (36.4 °C), Max:98.3 °F (36.8 °C)      Last 24hr Input/Output:    Intake/Output Summary (Last 24 hours) at 18 0829  Last data filed at 18 0200   Gross per 24 hour   Intake             2620 ml   Output             1735 ml   Net              885 ml          Exam:       Patient without distress. Abdomen:soft, expected tenderness, fundus firm, wound dressing intact     Lower extremities are nontender without edema.  No cords    Labs:   Lab Results   Component Value Date/Time    WBC 5.7 2018 04:38 AM    WBC 3.8 2018 11:02 AM    WBC 4.5 2017 09:05 PM    WBC 4.1 2016 12:35 AM    HGB 9.9 2018 04:38 AM    HGB 12.3 2018 11:02 AM    HGB 12.5 2017 09:05 PM    HGB 10.7 2016 12:35 AM    HCT 29.2 2018 04:38 AM    HCT 35.2 2018 11:02 AM    HCT 36.8 2017 09:05 PM    HCT 32.2 2016 12:35 AM    PLATELET 794  04:38 AM    PLATELET 788  11:02 AM    PLATELET 222  09:05 PM    PLATELET 442  12:35 AM       Recent Results (from the past 24 hour(s))   CBC WITH AUTOMATED DIFF    Collection Time: 18  4:38 AM   Result Value Ref Range    WBC 5.7 3.6 - 11.0 K/uL    RBC 3.29 (L) 3.80 - 5.20 M/uL    HGB 9.9 (L) 11.5 - 16.0 g/dL    HCT 29.2 (L) 35.0 - 47.0 %    MCV 88.8 80.0 - 99.0 FL    MCH 30.1 26.0 - 34.0 PG    MCHC 33.9 30.0 - 36.5 g/dL    RDW 13.5 11.5 - 14.5 %    PLATELET 673 (L) 310 - 400 K/uL    MPV 10.3 8.9 - 12.9 FL    NRBC 0.0 0  WBC    ABSOLUTE NRBC 0.00 0.00 - 0.01 K/uL    NEUTROPHILS 69 32 - 75 %    LYMPHOCYTES 20 12 - 49 %    MONOCYTES 10 5 - 13 %    EOSINOPHILS 1 0 - 7 %    BASOPHILS 1 0 - 1 %    IMMATURE GRANULOCYTES 0 0.0 - 0.5 %    ABS. NEUTROPHILS 3.9 1.8 - 8.0 K/UL    ABS. LYMPHOCYTES 1.2 0.8 - 3.5 K/UL    ABS. MONOCYTES 0.6 0.0 - 1.0 K/UL    ABS. EOSINOPHILS 0.1 0.0 - 0.4 K/UL    ABS. BASOPHILS 0.0 0.0 - 0.1 K/UL    ABS. IMM. GRANS. 0.0 0.00 - 0.04 K/UL    DF AUTOMATED             Assessment: Post-Op day 1, stable  B+/RI/ female infant    Plan:   1. Routine post-operative care   2. Will attempt void again this morning. If unable to spontaneously void, plan to replace woodruff cath and reattempt void later on this evening.

## 2018-02-06 NOTE — LACTATION NOTE
This note was copied from a baby's chart. Initial Lactation Consultation -  Baby born by  yesterday to a  mom at 44 1/7 weeks gestation. She says she nursed her other 2 children. She says this baby has been nursing and then she is offering some formula afterwards. I spoke to mom through the . She said she doesn't have enough milk that's why she is giving formula. I encouraged her to breast feeding the baby whenever she is acting hungry. She can feed her more often and try to cut down on the formula she gives her. She said her nipples are sore. I showed her how to hold the baby to get a deeper latch. Baby had taken formula one hour ago so was not interested in nursing. She will call out at the next feeding for assistance.

## 2018-02-07 VITALS
OXYGEN SATURATION: 99 % | HEART RATE: 79 BPM | SYSTOLIC BLOOD PRESSURE: 100 MMHG | RESPIRATION RATE: 16 BRPM | BODY MASS INDEX: 30.21 KG/M2 | HEIGHT: 61 IN | WEIGHT: 160 LBS | TEMPERATURE: 98.4 F | DIASTOLIC BLOOD PRESSURE: 61 MMHG

## 2018-02-07 PROCEDURE — 74011250637 HC RX REV CODE- 250/637: Performed by: OBSTETRICS & GYNECOLOGY

## 2018-02-07 RX ORDER — SIMETHICONE 80 MG
80 TABLET,CHEWABLE ORAL
Qty: 20 TAB | Refills: 0 | Status: SHIPPED | OUTPATIENT
Start: 2018-02-07

## 2018-02-07 RX ORDER — IBUPROFEN 800 MG/1
800 TABLET ORAL
Qty: 30 TAB | Refills: 1 | Status: SHIPPED | OUTPATIENT
Start: 2018-02-07

## 2018-02-07 RX ADMIN — IBUPROFEN 800 MG: 400 TABLET, FILM COATED ORAL at 04:25

## 2018-02-07 RX ADMIN — SIMETHICONE CHEW TAB 80 MG 80 MG: 80 TABLET ORAL at 04:25

## 2018-02-07 RX ADMIN — IBUPROFEN 800 MG: 400 TABLET, FILM COATED ORAL at 14:05

## 2018-02-07 RX ADMIN — DOCUSATE SODIUM 100 MG: 100 CAPSULE, LIQUID FILLED ORAL at 04:25

## 2018-02-07 NOTE — DISCHARGE INSTRUCTIONS
POSTPARTUM DISCHARGE INSTRUCTIONS       Name:  Rehana Lorenzo  YOB: 1990  Admission Diagnosis:  REPEAT , SPEAKS Hungarian  39 weeks gestation of pregnancy     Discharge Diagnosis:    Problem List as of 2018  Date Reviewed: 2018          Codes Class Noted - Resolved    39 weeks gestation of pregnancy ICD-10-CM: Z3A.39  ICD-9-CM: V22.2  2018 - Present            Attending Physician:  Thomasine Lesches, MD    Delivery Type:   Section: What to Expect at Home    Your Recovery:  A  section, or , is surgery to deliver your baby through a cut, called an incision that the doctor makes in your lower belly and uterus. You may have some pain in your lower belly and need pain medicine for 1 to 2 weeks. You can expect some vaginal bleeding for several weeks. You will probably need about 6 weeks to fully recover. It is important to take it easy while the incision is healing. Avoid heavy lifting, strenuous activities, or exercises that strain the belly muscles while you are recovering. Ask a family member or friend for help with housework, cooking, and shopping. This care sheet gives you a general idea about how long it will take for you to recover. But each person recovers at a different pace. Follow the steps below to get better as quickly as possible. How can you care for yourself at home? Activity  · Rest when you feel tired. Getting enough sleep will help you recover. · Try to walk each day. Start by walking a little more than you did the day before. Bit by bit, increase the amount you walk. Walking boosts blood flow and helps prevent pneumonia, constipation, and blood clots. · Avoid strenuous activities, such as bicycle riding, jogging, weightlifting, and aerobic exercise, for 6 weeks or until your doctor says it is okay. · Until your doctor says it is okay, do not lift anything heavier than your baby.   · Do not do sit-ups or other exercise that strain the belly muscles for 6 weeks or until your doctor says it is okay. · Hold a pillow over your incision when you cough or take deep breaths. This will support your belly and decrease your pain. · You may shower as usual. Pat the incision dry when you are done. · You will have some vaginal bleeding. Wear sanitary pads. Do not douche or use tampons until your doctor says it is okay. · Ask your doctor when you can drive again. · You will probably need to take at least 6 weeks off work. It depends on the type of work you do and how you feel. · Wait until you are healed (about 4 to 6 weeks) before you have sexual intercourse. Your doctor will tell you when it is okay to have sex. · Talk to your doctor about birth control. You can get pregnant even before your period returns. Also, you can get pregnant while you are breast-feeding. Incision care  Your skin is your body's first line of defense against germs, but an incision site leaves an easy way for germs to enter your body. To prevent infection:  · Clean your hands frequently and before and after changing any touching any dressings. · If you have strips of tape on the incision, leave the tape on for a week or until it falls off. · Look at your incision closely every day for any changes. Contact your doctor if you experience any signs of infection, such as fever or increased redness at the surgical site. · Wash the area daily with warm, soapy water, and pat it dry. Don't use hydrogen peroxide or alcohol, which can slow healing. You may cover the area with a gauze bandage if it weeps or rubs against clothing. Change the bandage every day. · Keep the area clean and dry. Diet  · You can eat your normal diet. If your stomach is upset, try bland, low-fat foods like plain rice, broiled chicken, toast, and yogurt. · Drink plenty of fluids (unless your doctor tells you not to). · You may notice that your bowel movements are not regular right after your surgery. This is common. Try to avoid constipation and straining with bowel movements. You may want to take a fiber supplement every day. If you have not had a bowel movement after a couple of days, ask your doctor about taking a mild laxative. · If you are breast-feeding, do not drink any alcohol. Medicines  · Take pain medicines exactly as directed. · If the doctor gave you a prescription medicine for pain, take it as prescribed. · If you are not taking a prescription pain medicine, ask your doctor if you can take an over-the-counter medicine such as acetaminophen (Tylenol), ibuprofen (Advil, Motrin), or naproxen (Aleve), for cramps. Read and follow all instructions on the label. Do not take aspirin, because it can cause more bleeding. Do not take acetaminophen (Tylenol) and other acetaminophen containing medications (i.e. Percocet) at the same time. · If you think your pain medicine is making you sick to your stomach:  · Take your medicine after meals (unless your doctor has told you not to). · Ask your doctor for a different pain medicine. · If your doctor prescribed antibiotics, take them as directed. Do not stop taking them just because you feel better. You need to take the full course of antibiotics. Mental Health  · Many women get the \"baby blues\" during the first few days after childbirth. You may lose sleep, feel irritable, and cry easily. You may feel happy one minute and sad the next. Hormone changes are one cause of these emotional changes. Also, the demands of a new baby, along with visits from relatives or other family needs, add to a mother's stress. The \"baby blues\" often peak around the fourth day. Then they ease up in less than 2 weeks. · If your moodiness or anxiety lasts for more than 2 weeks, or if you feel like life is not worth living, you may have postpartum depression. This is different for each mother.  Some mothers with serious depression may worry intensely about their infant's well-being. Others may feel distant from their child. Some mothers might even feel that they might harm their baby. A mother may have signs of paranoia, wondering if someone is watching her. · With all the changes in your life, you may not know if you are depressed. Pregnancy sometimes causes changes in how you feel that are similar to the symptoms of depression. · Symptoms of depression include:  · Feeling sad or hopeless and losing interest in daily activities. These are the most common symptoms of depression. · Sleeping too much or not enough. · Feeling tired. You may feel as if you have no energy. · Eating too much or too little. · POSTPARTUM SUPPORT INTERNATIONAL (PSI) offers a Warm line; Chat with the Expert phone sessions; Information and Articles about Pregnancy and Postpartum Mood Disorders; Comprehensive List of Free Support Groups; Knowledgeable local coordinators who will offer support, information, and resources; Guide to Resources on [employer]; Calendar of events in the  mood disorders community; Latest News and Research; and [facility] [address] for [employer]. Remember - You are not alone; You are not to blame; With help, you will be well. 1-349-810-PPD(6134). WWW. POSTPARTUM. NET   · Writing or talking about death, such as writing suicide notes or talking about guns, knives, or pills. Keep the numbers for these national suicide hotlines: 6-255-519-TALK (4-830.260.8898) and 2-927-YGMRGNF (8-108.282.4070). If you or someone you know talks about suicide or feeling hopeless, get help right away. Other instructions  · If you breast-feed your baby, you may be more comfortable while you are healing if you place the baby so that he or she is not resting on your belly. Try tucking your baby under your arm, with his or her body along the side you will be feeding on. Support your baby's upper body with your arm.  With that hand you can control your baby's head to bring his or her mouth to your breast. This is sometimes called the football hold. Follow-up care is a key part of your treatment and safety. Be sure to make and go to all appointments, and call your doctor if you are having problems. It's also a good idea to know your test results and keep a list of the medicines you take. When should you call for help? Call 911 anytime you think you may need emergency care. For example, call if:  · You are thinking of hurting yourself, your baby, or anyone else. · You passed out (lost consciousness). · You have symptoms of a blood clot in your lung (called a pulmonary embolism). These may include:  · Sudden chest pain. · Trouble breathing. · Coughing up blood. Call your doctor now or seek immediate medical care if:    · You have severe vaginal bleeding. · You are soaking through a pad each hour for 2 or more hours. · Your vaginal bleeding seems to be getting heavier or is still bright red 4 days after delivery. · You are dizzy or lightheaded, or you feel like you may faint. · You are vomiting or cannot keep fluids down. · You have a fever. · You have new or more belly pain. · You have loose stitches, or your incision comes open. · You have symptoms of infection, such as:  · Increased pain, swelling, warmth, or redness. · Red streaks leading from the incision. · Pus draining from the incision. · A fever  · You pass tissue (not just blood). · Your vaginal discharge smells bad. · Your belly feels tender or full and hard. · Your breasts are continuously painful or red. · You feel sad, anxious, or hopeless for more than a few days. · You have sudden, severe pain in your belly. · You have symptoms of a blood clot in your leg (called a deep vein thrombosis), such as:  · Pain in your calf, back of the knee, thigh, or groin. · Redness and swelling in your leg or groin.   · You have symptoms of preeclampsia, such as:  · Sudden swelling of your face, hands, or feet.  · New vision problems (such as dimness or blurring). · A severe headache. · Your blood pressure is higher than it should be or rises suddenly. · You have new nausea or vomiting. Watch closely for changes in your health, and be sure to contact your doctor if you have any problems. Additional Information:  Preventing Infection at Home    We care about preventing infection and avoiding the spread of germs - not only when you are in the hospital but also when you return home. When you return home from the hospital, it's important to take the following steps to help prevent infection and avoid spreading germs that could infect you and others. Ask everyone in your home to follow these guidelines, too. Clean Your Hands  · Clean your hands whenever your hands are visibly dirty, before you eat, before or after touching your mouth, nose or eyes, and before preparing food. Clean them after contact with body fluids, using the restroom, touching animals or changing diapers. · When washing hands, wet them with warm water and work up a lather. Rub hands for at least 15 seconds, then rinse them and pat them dry with a clean towel or paper towel. · When using hand sanitizers, it should take about 15 seconds to rub your hands dry. If not, you probably didn't apply enough . Cover Your Sneeze or Cough  Germs are released into the air whenever you sneeze or cough. To prevent the spread of infection:  · Turn away from other people before coughing or sneezing. · Cover your mouth or nose with a tissue when you cough or sneeze. Put the tissue in the trash. · If you don't have a tissue, cough or sneeze into your upper sleeve, not your hands. · Always clean your hands after coughing or sneezing. Care for Wounds  Your skin is your body's first line of defense against germs, but an open wound leaves an easy way for germs to enter your body.  To prevent infection:  · Clean your hands before and after changing wound dressings, and wear gloves to change dressings if recommended by your doctor. · Take special care with IV lines or other devices inserted into the body. If you must touch them, clean your hands first.  · Follow any specific instructions from your doctor to care for your wounds. Contact your doctor if you experience any signs of infection, such as fever or increased redness at the surgical or wound site. Keep a Clean Home  · Clean or wipe commonly touched hard surfaces like door handles, sinks, tabletops, phones and TV remotes. · Use products labeled \"disinfectant\" to kill harmful bacteria and viruses. · Use a clean cloth or paper towel to clean and dry surfaces. Wiping surfaces with a dirty dishcloth, sponge or towel will only spread germs. · Never share toothbrushes, castañeda, drinking glasses, utensils, razor blades, face cloths or bath towels to avoid spreading germs. · Be sure that the linens that you sleep on are clean. · Keep pets away from wounds and wash your hands after touching pets, their toys or bedding. We care about you and your health. Remember, preventing infections is a   team effort between you, your family, friends and health care providers. Postpartum Support    PARENTS:  Are you feeling sad or depressed? Is it difficult for you to enjoy yourself? Do you feel more irritable or tense? Do you feel anxious or panicky? Are you having difficulty bonding with your baby? Do you feel as if you are \"out of control\" or \"going crazy\"? Are you worried that you might hurt your baby or yourself? FAMILIES: Do you worry that something is wrong but don't know how to help? Do you think that your partner or spouse is having problems coping? Are you worried that it may never get better? While many women experience some mild mood change or \"the blues\" during or after the birth of a child, 1 in 9 women experience more significant symptoms of depression or anxiety.   1 in 10 Dads become depressed during the first year. Things you can do  Being a good parent includes taking care of yourself. If you take care of yourself, you will be able to take better care of your baby and your family. · Talk to a counselor or healthcare provider who has training in  mood and anxiety problems. · Learn as much as you can about pregnancy and postpartum depression and anxiety. · Get support from family and friends. Ask for help when you need it. · Join a support group in your area or online. · Keep active by walking, stretching or whatever form of exercise helps you to feel better. · Get enough rest and time for yourself. · Eat a healthy diet. · Don't give up! It may take more than one try to get the right help you need. These are general instructions for a healthy lifestyle:    No smoking/ No tobacco products/ Avoid exposure to second hand smoke    Surgeon General's Warning:  Quitting smoking now greatly reduces serious risk to your health. Obesity, smoking, and sedentary lifestyle greatly increases your risk for illness    A healthy diet, regular physical exercise & weight monitoring are important for maintaining a healthy lifestyle    Recognize signs and symptoms of STROKE:    F-face looks uneven    A-arms unable to move or move unevenly    S-speech slurred or non-existent    T-time-call 911 as soon as signs and symptoms begin - DO NOT go       back to bed or wait to see if you get better - TIME IS BRAIN. I have had the opportunity to make my options or choices for discharge. I have received and understand these instructions.

## 2018-02-07 NOTE — LACTATION NOTE
This note was copied from a baby's chart. I spoke to mother through an . Mother was complaining of sore nipples. She is using lanolin. She said she had the same thing with her other children. I did not see any breaks in her nipples that I could see. Mother has been breast/bottle feeding. I suggested to her that maybe infant was getting used to the bottle and not opening her mouth wide like she needs to when she breastfeeds. She has not given any formula since last night. I also told mother that she brings the baby to her breast to breastfeed and not to let her on the nipple unless she has a wide open mouth. Mother had no other concerns today. 1400 Mother has decided to go home this afternoon. Breasts may become engorged when milk \"comes in\". How milk is made / normal phases of milk production, supply and demand discussed. Taught care of engorged breasts - frequent breastfeeding encouraged, warm compresses and breast massage ac. Then nurse the baby or pump. Apply cold compresses pc x 15 minutes a few times a day for swelling or discomfort. May need to do this care for a couple of days. Teaching complete and all questions answered.

## 2018-02-07 NOTE — PROGRESS NOTES
Post-Operative Day Number 2 Progress Note    Patient doing well post-op day 2 from  delivery without significant complaints. Pain controlled on current medication. Voiding without difficulty, normal lochia. Vitals:  Patient Vitals for the past 8 hrs:   BP Temp Pulse Resp   18 1030 100/61 98.4 °F (36.9 °C) 79 16     Temp (24hrs), Av.3 °F (36.8 °C), Min:97.9 °F (36.6 °C), Max:98.5 °F (36.9 °C)      Vital signs stable, afebrile. Exam:  Patient without distress. Abdomen soft, fundus firm at level of umbilicus, non tender. Dressing dry               Lower extremities are negative for swelling, cords or tenderness. Lab/Data Review: All lab results for the last 24 hours reviewed. Assessment and Plan:  Patient appears to be having uncomplicated post- course. Continue routine post-op care and maternal education. Wants discharge today.   Plan for 1 week f/u for staple removal.

## 2018-02-07 NOTE — DISCHARGE SUMMARY
Obstetrical Discharge Summary     Name: Dorothy Cosby MRN: [id number]  SSN: [id number]    YOB: 1990  Age: 33 y.o. Sex: female      Allergies: Review of patient's allergies indicates no known allergies. Admit Date: 2018    Discharge Date: 2018     Admitting Physician: Sukhjinder Rocha MD     Attending Physician:  Sukhjinder Rocha MD     * Admission Diagnoses: REPEAT , SPEAKS Czech  39 weeks gestation of pregnancy    * Discharge Diagnoses:   Information for the patient's :  Awa Parisi [[id number]]   Delivery of a 2.98 kg female infant via , Low Transverse on 2018 at 8:42 AM  by . Apgars were 9 and 10. Additional Diagnoses:   Hospital Problems as of 2018  Date Reviewed: 2018          Codes Class Noted - Resolved POA    39 weeks gestation of pregnancy ICD-10-CM: Z3A.39  ICD-9-CM: V22.2  2018 - Present Unknown             Lab Results   Component Value Date/Time    ABO/Rh(D) B POSITIVE 2018 11:02 AM    Rubella, External Immune 2017    GrBStrep, External Negative 2018    ABO,Rh B Positive 2017      There is no immunization history on file for this patient. * Procedures:   Procedure(s) with comments:   SECTION - [facility]            * Discharge Condition: good    [facility] Course: Normal hospital course following the delivery. * Disposition: Home    Discharge Medications:   Current Discharge Medication List      START taking these medications    Details   ibuprofen (MOTRIN) 800 mg tablet Take 1 Tab by mouth every eight (8) hours as needed for Pain. Qty: 30 Tab, Refills: 1      simethicone (MYLICON) 80 mg chewable tablet Take 1 Tab by mouth four (4) times daily as needed. Qty: 20 Tab, Refills: 0         CONTINUE these medications which have NOT CHANGED    Details   PNV No12-Iron-FA-DSS-OM-3 29 mg iron-1 mg -50 mg CPKD Take 1 Tab by mouth daily.       raNITIdine (ZANTAC 75) 75 mg tablet Take 75 mg by mouth daily.             * Follow-up Care/Patient Instructions:   Activity: no sex/tampons/baths/swimming 6 weeks  Diet: Regular Diet  Wound Care: Keep wound clean and dry    Follow-up Information     Follow up With Details Comments [id number] Shira Rodriguez MD   [facility] [address]  [phone]             Signed By:  Fred Marie MD     February 7, 2018

## 2018-02-07 NOTE — ROUTINE PROCESS
Bedside shift change report given to Sol Covarrubias RN (oncoming nurse) by Demar Miller. Edgar Sagastume RN (offgoing nurse). Report included the following information SBAR, Kardex, Procedure Summary, Intake/Output, MAR and Recent Results.

## 2018-02-07 NOTE — ROUTINE PROCESS
Bedside and Verbal shift change report given to JEF Meza (oncoming nurse) by Chavo Turcios RN (offgoing nurse). Report included the following information SBAR, Kardex, Procedure Summary, Intake/Output, MAR and Recent Results.      1650 All teaching completed and DC inst given to pt and family via blue phone. Infant DC inst printed in Japanese. Pt verbalizes understanding and denies c/o or questions. DC'd to home via wheelchair to car.

## 2019-11-20 ENCOUNTER — HOSPITAL ENCOUNTER (EMERGENCY)
Age: 29
Discharge: HOME | End: 2019-11-20
Attending: EMERGENCY MEDICINE | Admitting: EMERGENCY MEDICINE
Payer: SUBSIDIZED

## 2019-11-20 ENCOUNTER — APPOINTMENT (OUTPATIENT)
Dept: CT IMAGING | Age: 29
End: 2019-11-20
Attending: NURSE PRACTITIONER
Payer: SUBSIDIZED

## 2019-11-20 VITALS
DIASTOLIC BLOOD PRESSURE: 73 MMHG | RESPIRATION RATE: 16 BRPM | OXYGEN SATURATION: 99 % | TEMPERATURE: 98.6 F | SYSTOLIC BLOOD PRESSURE: 110 MMHG | HEART RATE: 97 BPM

## 2019-11-20 DIAGNOSIS — N30.01 ACUTE CYSTITIS WITH HEMATURIA: Primary | ICD-10-CM

## 2019-11-20 LAB
ALBUMIN SERPL-MCNC: 3.8 G/DL (ref 3.5–5)
ALBUMIN/GLOB SERPL: 0.9 {RATIO} (ref 1.1–2.2)
ALP SERPL-CCNC: 41 U/L (ref 45–117)
ALT SERPL-CCNC: 15 U/L (ref 12–78)
ANION GAP SERPL CALC-SCNC: 2 MMOL/L (ref 5–15)
APPEARANCE UR: ABNORMAL
AST SERPL-CCNC: 6 U/L (ref 15–37)
BACTERIA URNS QL MICRO: ABNORMAL /HPF
BASOPHILS # BLD: 0 K/UL (ref 0–0.1)
BASOPHILS NFR BLD: 1 % (ref 0–1)
BILIRUB SERPL-MCNC: 0.9 MG/DL (ref 0.2–1)
BILIRUB UR QL: NEGATIVE
BUN SERPL-MCNC: 15 MG/DL (ref 6–20)
BUN/CREAT SERPL: 19 (ref 12–20)
CALCIUM SERPL-MCNC: 9.2 MG/DL (ref 8.5–10.1)
CHLORIDE SERPL-SCNC: 107 MMOL/L (ref 97–108)
CO2 SERPL-SCNC: 30 MMOL/L (ref 21–32)
COLOR UR: ABNORMAL
COMMENT, HOLDF: NORMAL
CREAT SERPL-MCNC: 0.78 MG/DL (ref 0.55–1.02)
DIFFERENTIAL METHOD BLD: NORMAL
EOSINOPHIL # BLD: 0.1 K/UL (ref 0–0.4)
EOSINOPHIL NFR BLD: 2 % (ref 0–7)
EPITH CASTS URNS QL MICRO: ABNORMAL /LPF
ERYTHROCYTE [DISTWIDTH] IN BLOOD BY AUTOMATED COUNT: 13.7 % (ref 11.5–14.5)
GLOBULIN SER CALC-MCNC: 4.2 G/DL (ref 2–4)
GLUCOSE SERPL-MCNC: 101 MG/DL (ref 65–100)
GLUCOSE UR STRIP.AUTO-MCNC: NEGATIVE MG/DL
HCG UR QL: NEGATIVE
HCT VFR BLD AUTO: 39.1 % (ref 35–47)
HGB BLD-MCNC: 12 G/DL (ref 11.5–16)
HGB UR QL STRIP: ABNORMAL
IMM GRANULOCYTES # BLD AUTO: 0 K/UL (ref 0–0.04)
IMM GRANULOCYTES NFR BLD AUTO: 0 % (ref 0–0.5)
KETONES UR QL STRIP.AUTO: 15 MG/DL
LEUKOCYTE ESTERASE UR QL STRIP.AUTO: ABNORMAL
LYMPHOCYTES # BLD: 1.2 K/UL (ref 0.8–3.5)
LYMPHOCYTES NFR BLD: 25 % (ref 12–49)
MCH RBC QN AUTO: 26.3 PG (ref 26–34)
MCHC RBC AUTO-ENTMCNC: 30.7 G/DL (ref 30–36.5)
MCV RBC AUTO: 85.7 FL (ref 80–99)
MONOCYTES # BLD: 0.5 K/UL (ref 0–1)
MONOCYTES NFR BLD: 11 % (ref 5–13)
NEUTS SEG # BLD: 3 K/UL (ref 1.8–8)
NEUTS SEG NFR BLD: 61 % (ref 32–75)
NITRITE UR QL STRIP.AUTO: NEGATIVE
NRBC # BLD: 0 K/UL (ref 0–0.01)
NRBC BLD-RTO: 0 PER 100 WBC
PH UR STRIP: 7 [PH] (ref 5–8)
PLATELET # BLD AUTO: 235 K/UL (ref 150–400)
PMV BLD AUTO: 9.4 FL (ref 8.9–12.9)
POTASSIUM SERPL-SCNC: 4 MMOL/L (ref 3.5–5.1)
PROT SERPL-MCNC: 8 G/DL (ref 6.4–8.2)
PROT UR STRIP-MCNC: 300 MG/DL
RBC # BLD AUTO: 4.56 M/UL (ref 3.8–5.2)
RBC #/AREA URNS HPF: ABNORMAL /HPF (ref 0–5)
SAMPLES BEING HELD,HOLD: NORMAL
SODIUM SERPL-SCNC: 139 MMOL/L (ref 136–145)
SP GR UR REFRACTOMETRY: 1.01 (ref 1–1.03)
UR CULT HOLD, URHOLD: NORMAL
UROBILINOGEN UR QL STRIP.AUTO: 0.2 EU/DL (ref 0.2–1)
WBC # BLD AUTO: 4.8 K/UL (ref 3.6–11)
WBC URNS QL MICRO: >100 /HPF (ref 0–4)

## 2019-11-20 PROCEDURE — 81001 URINALYSIS AUTO W/SCOPE: CPT

## 2019-11-20 PROCEDURE — 96365 THER/PROPH/DIAG IV INF INIT: CPT

## 2019-11-20 PROCEDURE — 74011250636 HC RX REV CODE- 250/636: Performed by: NURSE PRACTITIONER

## 2019-11-20 PROCEDURE — 81025 URINE PREGNANCY TEST: CPT

## 2019-11-20 PROCEDURE — 80053 COMPREHEN METABOLIC PANEL: CPT

## 2019-11-20 PROCEDURE — 74011000258 HC RX REV CODE- 258: Performed by: NURSE PRACTITIONER

## 2019-11-20 PROCEDURE — 87086 URINE CULTURE/COLONY COUNT: CPT

## 2019-11-20 PROCEDURE — 85025 COMPLETE CBC W/AUTO DIFF WBC: CPT

## 2019-11-20 PROCEDURE — 87186 SC STD MICRODIL/AGAR DIL: CPT

## 2019-11-20 PROCEDURE — 99283 EMERGENCY DEPT VISIT LOW MDM: CPT

## 2019-11-20 PROCEDURE — 36415 COLL VENOUS BLD VENIPUNCTURE: CPT

## 2019-11-20 PROCEDURE — 87077 CULTURE AEROBIC IDENTIFY: CPT

## 2019-11-20 PROCEDURE — 74176 CT ABD & PELVIS W/O CONTRAST: CPT

## 2019-11-20 RX ORDER — PHENAZOPYRIDINE HYDROCHLORIDE 200 MG/1
200 TABLET, FILM COATED ORAL 3 TIMES DAILY
Qty: 6 TAB | Refills: 0 | Status: SHIPPED | OUTPATIENT
Start: 2019-11-20 | End: 2019-11-22

## 2019-11-20 RX ORDER — CEFUROXIME AXETIL 500 MG/1
500 TABLET ORAL 2 TIMES DAILY
Qty: 14 TAB | Refills: 0 | Status: SHIPPED | OUTPATIENT
Start: 2019-11-20 | End: 2019-11-27

## 2019-11-20 RX ADMIN — CEFTRIAXONE 1 G: 1 INJECTION, POWDER, FOR SOLUTION INTRAMUSCULAR; INTRAVENOUS at 11:02

## 2019-11-20 NOTE — ED NOTES
MD reviewed discharge instructions and options with patient and patient verbalized understanding. RN reviewed discharge instructions using teachback method. Pt ambulated to exit without difficulty and in no signs of acute distress, and she  will drive home. No complaints or needs expressed at this time. Patient was counseled on medications prescribed at discharge. VSS, verbalized relief from most intense pain. Patient to call . PCP in the morning for appointment.

## 2019-11-20 NOTE — ED PROVIDER NOTES
Initial Complaint: abd pain    Started: last night    Endorses: dysuria, nausea, vomiting, back pain, hematuria. Denies: F/C, diarrhea    Made better: nothing  Made worse: nothing    No further complaints. History reviewed. No pertinent past medical history. Past Surgical History:  No date:  DELIVERY ONLY      Comment:   and   No date: HX  SECTION  Reviewed      Primary care provider: Ivonne Masterson MD      The history is provided by the patient and the spouse. A  was used ( spouse). History reviewed. No pertinent past medical history. Past Surgical History:   Procedure Laterality Date     DELIVERY ONLY       and     HX  SECTION       History reviewed. No pertinent family history.     Social History     Socioeconomic History    Marital status:      Spouse name: Not on file    Number of children: Not on file    Years of education: Not on file    Highest education level: Not on file   Occupational History    Not on file   Social Needs    Financial resource strain: Not on file    Food insecurity:     Worry: Not on file     Inability: Not on file    Transportation needs:     Medical: Not on file     Non-medical: Not on file   Tobacco Use    Smoking status: Never Smoker    Smokeless tobacco: Never Used   Substance and Sexual Activity    Alcohol use: No    Drug use: No    Sexual activity: Yes     Partners: Male   Lifestyle    Physical activity:     Days per week: Not on file     Minutes per session: Not on file    Stress: Not on file   Relationships    Social connections:     Talks on phone: Not on file     Gets together: Not on file     Attends Orthodox service: Not on file     Active member of club or organization: Not on file     Attends meetings of clubs or organizations: Not on file     Relationship status: Not on file    Intimate partner violence:     Fear of current or ex partner: Not on file Emotionally abused: Not on file     Physically abused: Not on file     Forced sexual activity: Not on file   Other Topics Concern    Not on file   Social History Narrative    Not on file     ALLERGIES: Patient has no known allergies. Review of Systems   Constitutional: Negative for chills and fever. Gastrointestinal: Positive for abdominal pain, nausea and vomiting. Genitourinary: Positive for dysuria and flank pain. Musculoskeletal: Positive for back pain. Psychiatric/Behavioral: Negative. Vitals:    11/20/19 0852   BP: 110/73   Pulse: 97   Resp: 16   Temp: 98.6 °F (37 °C)   SpO2: 99%            Physical Exam  Vitals signs and nursing note reviewed. Constitutional:       General: She is not in acute distress. Appearance: She is well-developed and normal weight. She is not ill-appearing, toxic-appearing or diaphoretic. HENT:      Head: Normocephalic and atraumatic. Neck:      Musculoskeletal: Normal range of motion and neck supple. Cardiovascular:      Rate and Rhythm: Normal rate and regular rhythm. Heart sounds: Normal heart sounds. Pulmonary:      Effort: Pulmonary effort is normal. No respiratory distress. Breath sounds: Normal breath sounds. No wheezing or rales. Chest:      Chest wall: No tenderness. Abdominal:      General: Abdomen is protuberant. Bowel sounds are normal.      Palpations: Abdomen is soft. Tenderness: There is tenderness in the suprapubic area. There is right CVA tenderness and left CVA tenderness. There is no guarding. Musculoskeletal: Normal range of motion. Skin:     General: Skin is warm and dry. Findings: No erythema. Neurological:      Mental Status: She is alert and oriented to person, place, and time. Psychiatric:         Behavior: Behavior normal.         Thought Content:  Thought content normal.         Judgment: Judgment normal.        MDM       Procedures    Assessment & Plan:     DDx: UTI, pyelonephritis, stone    Orders Placed This Encounter    URINE CULTURE HOLD SAMPLE    CT ABD PELV WO CONT    URINALYSIS W/MICROSCOPIC    CBC WITH AUTOMATED DIFF    METABOLIC PANEL, COMPREHENSIVE    POC URINE PREGNANCY TEST    HCG URINE, QL. - POC       Discussed with Cheyenne Del Rio MD,ED Provider    Jennifer Nam NP  11/20/19  9:13 AM    Urine positive for UTI. CT scan without pyelonephritis or kidney stone. Discharge home with Ceftin. 1 dose of Rocephin while in the ED. Follow-up with PCP. Discussed return precautions. 11:15 AM  Patient re-evaluated. All questions answered. Patient appropriate for discharge. Given return precautions and follow up instructions. LABORATORY TESTS:  Labs Reviewed   URINALYSIS W/MICROSCOPIC - Abnormal; Notable for the following components:       Result Value    Appearance TURBID (*)     Protein 300 (*)     Ketone 15 (*)     Blood LARGE (*)     Leukocyte Esterase MODERATE (*)     WBC >100 (*)     Bacteria 1+ (*)     All other components within normal limits   METABOLIC PANEL, COMPREHENSIVE - Abnormal; Notable for the following components:    Anion gap 2 (*)     Glucose 101 (*)     AST (SGOT) 6 (*)     Alk. phosphatase 41 (*)     Globulin 4.2 (*)     A-G Ratio 0.9 (*)     All other components within normal limits   URINE CULTURE HOLD SAMPLE   CULTURE, URINE   CBC WITH AUTOMATED DIFF   SAMPLES BEING HELD   HCG URINE, QL. - POC       IMAGING RESULTS:  CT ABD PELV WO CONT   Final Result   IMPRESSION:   No renal or ureteral stone or evidence of hydronephrosis. Mild bladder wall   thickening and slight perivesical inflammation may represent acute cystitis. MEDICATIONS GIVEN:  Medications   cefTRIAXone (ROCEPHIN) 1 g in 0.9% sodium chloride (MBP/ADV) 50 mL (1 g IntraVENous New Bag 11/20/19 1102)       IMPRESSION:  1. Acute cystitis with hematuria        PLAN:  1.    Current Discharge Medication List      START taking these medications    Details   cefUROXime (CEFTIN) 500 mg tablet Take 1 Tab by mouth two (2) times a day for 7 days. Indications: Bacterial Urinary Tract Infection  Qty: 14 Tab, Refills: 0      phenazopyridine (PYRIDIUM) 200 mg tablet Take 1 Tab by mouth three (3) times daily for 2 days. Indications: difficult or painful urination, urinary tract irritation  Qty: 6 Tab, Refills: 0           2. Follow-up Information     Follow up With Specialties Details Why Contact Info    Aguila Richter MD Gynecology, Obstetrics Schedule an appointment as soon as possible for a visit  [address] [facility] [address] [facility] [address], [facility] [address] [facility] Emergency Medicine  As needed, If symptoms worsen [address] [facility]  [phone]        3. Return to ED for new or worsening symptoms       Kimmie Car NP                  Please note that this dictation was completed with [employer], the computer voice recognition software. Quite often unanticipated grammatical, syntax, homophones, and other interpretive errors are inadvertently transcribed by the computer software. Please disregard these errors. Please excuse any errors that have escaped final proofreading. I was personally available for consultation in the emergency department. I have reviewed the chart and agree with the documentation recorded by the [facility], including the assessment, treatment plan, and disposition.   Christelle Paul MD

## 2019-11-20 NOTE — ED TRIAGE NOTES
Lower abd pain since Monday, denies fever, +dysuria, denies n/v, denies diarrhea or constipation, denies pregnancy

## 2019-11-20 NOTE — DISCHARGE INSTRUCTIONS
ÔßÑðÇ áß Úáì ÇáÓãÇÍ áäÇ ÈÑÚÇíÊß Çáíæã. íÑÌì ÇáãÊÇÈÚÉ ãÚ ãÞÏã ÇáÑÚÇíÉ ÇáÃæáíÉ ÇáÎÇÕ Èß Ýí ÛÖæä 2-3 ÃíÇã ÇáÞÇÏãÉ ÅÐÇ áã ÊÊÍÓä ÇáÃÚÑÇÖ. ÎØÉ ááãäÒá:     íæãíä ááÊÈæá ÇáãÄáã. biridyum thlath marrat ywmyana limudat yawmayn liltabawul almulim. Êã ÅÑÓÇá ËÞÇÝÉ ÇáÈæá Úáì ÇáÈæá. ÅÐÇ ÚÇÏÊ ÇáËÞÇÝÉ TAHHOLWNC ÇáÍíæíÉ ÇáÊí JYOTHGV Åáì ÇáãäÒá áÇ ÊÚÇáÌ ÇáÚÏæì ¡ ÝÓäÞæã KUINMNHY Èß. ÅÐÇ ßÇä íÛØí ÚÏæì ZDOEAPN TEDVHJB Ýáä äÊÕá Èß. ÅÐÇ áã ÊÊÍÓä ÇáÃÚÑÇÖ Ãæ ÇáÚÏæì ¡ ÝíÑÌì HHHDYPJD ãÚ ãÞÏã ÇáÑÚÇíÉ ÇáÃæáíÉ. ÔÑÈ ÇáßËíÑ ãä PXNIOGB  ÎÐ ÇáãÖÇÏÇÊ ÇáÍíæíÉ ÇáÎÇÕÉ Èß Úáì ÇáäÍæ ÇáãäÕæÕ Úáíå. ÓíÝÊíä ãÑÊíä íæãíðÇ áãÏÉ 7 ÃíÇã. ÑÇÌÚ ãÞÏã ÇáÑÚÇíÉ ÇáÃæáíÉ ÅÐÇ áã ÊÊÍÓä ÇáÃÚÑÇÖ Ýí 2-3 ÇáÞÇÏãÉ    ÇÑÌÚ Åáì ÞÓã ÇáØæÇÑÆ ÅÐÇ ßäÊ ÊÚÇäí ãä ÃÚÑÇÖ ãÊÝÇÞãÉ ¡ Ãæ Íãì ÊÒíÏ Úä 100. 9 ¡ Ãæ ÊåÊÒ ÞÔÚÑíÑÉ ¡ Ãæ ÛËíÇä Ãæ ÞíÁ. shkrana lak ealaa alsamah dania bireayatuk alyawma. yrja almutabaeat powell muqadim alrieayat al'awaliat alkhasi bik fi ghdwn 2-3 'ayam alqadimat 'iidha lm tatahasan al'aerad. khutat lilmanzal:  cyn 'iirsal thaqafat albawl ealaa albul. 'iidha eadat althaqafat walmidadat alhayawiat alty 'arsalatha 'iilaa almanzil la tuealaj aleadwaa , fasanuqum bialaitisal bika. 'iidha jane yughatiy eadwaa almisalik albawliat falan natasil bika. 'iidha lm tatahasan al'aerad 'aw aleadwaa , firjaa mutabaeataha powell muqadim alrieayat al'awaliat. shurb alkthyr min alsawayil  khudh almudadaat alhayawiat alkhasat bik ealaa alnahw almunsus ealayh. sayfatayn maratayn ywmyana limudat 7 'ayam.  rajie muqadim alrieayat al'awaliat 'iidha lm tatahasan al'aerad fi 2-3 alqadima  'arjie 'iilaa qism altawari 'iidha kunt tueani min 'aerad mutafaqimat , 'aw humaa tazid william 100.9 , 'aw tahtaza qasherirat , 'aw ghuthyan 'aw qay'. Thank you for allowing us to care for you today. Please follow-up with your Primary Care provider in the next 2-3 days if your symptoms do not improve. Plan for home:     Pyridium three times daily for 2 days for painful urination.      A urine culture was sent on your urine. If the culture returns and the antibiotic you were sent home on does not treat your infection we will contact you. If it does cover your urinary tract infection we will NOT call you. If your symptoms or infection doesnt improve please follow-up with your primary care provider. Drink plenty of fluids  Take your antibiotics as prescribed. Ceftin twice daily for 7 days. See your primary care provider if the symptoms do not improve in the next 2-3    Come back to the ER if you have worsening symptoms, fevers over 100.9, shaking chills, nausea or vomiting. Patient Education   If you see this message, please contact your IT team to request the [employer] RTF Kazakh and Farsi documents.

## 2019-11-22 LAB
BACTERIA SPEC CULT: ABNORMAL
CC UR VC: ABNORMAL
SERVICE CMNT-IMP: ABNORMAL

## (undated) DEVICE — REM POLYHESIVE ADULT PATIENT RETURN ELECTRODE: Brand: VALLEYLAB

## (undated) DEVICE — STERILE POLYISOPRENE POWDER-FREE SURGICAL GLOVES: Brand: PROTEXIS

## (undated) DEVICE — SOLIDIFIER MEDC 1200ML -- CONVERT TO 356117

## (undated) DEVICE — (D)PREP SKN CHLRAPRP APPL 26ML -- CONVERT TO ITEM 371833

## (undated) DEVICE — SPONGE: LAP 18X18 W  200/CS: Brand: MEDICAL ACTION INDUSTRIES

## (undated) DEVICE — PACK PROCEDURE SURG C SECT KT SMH

## (undated) DEVICE — SUTURE VCRL SZ 3-0 L36IN ABSRB VLT CT L40MM 1/2 CIR J356H

## (undated) DEVICE — TRAY SPNL 24GA ANES

## (undated) DEVICE — DEVON™ KNEE AND BODY STRAP 60" X 3" (1.5 M X 7.6 CM): Brand: DEVON

## (undated) DEVICE — STERILE POLYISOPRENE POWDER-FREE SURGICAL GLOVES WITH EMOLLIENT COATING: Brand: PROTEXIS

## (undated) DEVICE — SOLUTION IV 1000ML 0.9% SOD CHL

## (undated) DEVICE — SUTURE MCRYL SZ 1 L36IN ABSRB VLT CT-1 L36MM 1/2 CIR TAPR Y347H

## (undated) DEVICE — Device: Brand: PORTEX

## (undated) DEVICE — SUTURE VCRL SZ 0 L36IN ABSRB VLT L40MM CT 1/2 CIR J358H

## (undated) DEVICE — ROYALSILK SURGICAL GOWN, L: Brand: CONVERTORS

## (undated) DEVICE — STAPLER SKIN SQ 30 ABSRB STPL DISP INSORB

## (undated) DEVICE — COVERALL PREM SMS 2XL KNIT --

## (undated) DEVICE — KENDALL SCD EXPRESS SLEEVES, KNEE LENGTH, MEDIUM: Brand: KENDALL SCD

## (undated) DEVICE — LIGHT HANDLE: Brand: DEVON

## (undated) DEVICE — SUTURE MCRYL SZ 4-0 L27IN ABSRB UD L24MM PS-1 3/8 CIR PRIM Y935H

## (undated) DEVICE — DRAPE FLD WRM W44XL66IN C6L FOR INTRATEMP SYS THERMABASIN

## (undated) DEVICE — DRESSING AQUACEL ADVANTAGE ALG W4XL5IN RECT GREATER ABSRB HYDRFBR TECHNOLOGY

## (undated) DEVICE — ROYAL SILK SURGICAL GOWN, XXL: Brand: CONVERTORS

## (undated) DEVICE — CATH FOLEY 16F LUBRI-SIL IC --

## (undated) DEVICE — SOLUTION IRRIG 1000ML H2O STRL BLT

## (undated) DEVICE — POOLE SUCTION INSTRUMENT WITH REMOVABLE SHEATH: Brand: POOLE

## (undated) DEVICE — 3000CC GUARDIAN II: Brand: GUARDIAN

## (undated) DEVICE — 3M™ TEGADERM™ TRANSPARENT FILM DRESSING FRAME STYLE, 1626, 4 IN X 4-3/4 IN (10 CM X 12 CM), 50/CT 4CT/CASE: Brand: 3M™ TEGADERM™